# Patient Record
Sex: FEMALE | Race: WHITE | Employment: OTHER | URBAN - METROPOLITAN AREA
[De-identification: names, ages, dates, MRNs, and addresses within clinical notes are randomized per-mention and may not be internally consistent; named-entity substitution may affect disease eponyms.]

---

## 2017-01-04 ENCOUNTER — MYC MEDICAL ADVICE (OUTPATIENT)
Dept: FAMILY MEDICINE | Facility: CLINIC | Age: 67
End: 2017-01-04

## 2017-01-27 ENCOUNTER — ANESTHESIA EVENT (OUTPATIENT)
Dept: GASTROENTEROLOGY | Facility: CLINIC | Age: 67
End: 2017-01-27
Payer: COMMERCIAL

## 2017-01-27 NOTE — ANESTHESIA PREPROCEDURE EVALUATION
Anesthesia Evaluation     . Pt has not had prior anesthetic       ROS/MED HX    ENT/Pulmonary:  - neg pulmonary ROS     Neurologic:  - neg neurologic ROS     Cardiovascular:     (+) Dyslipidemia, ----. : . . . :. .       METS/Exercise Tolerance:     Hematologic:  - neg hematologic  ROS       Musculoskeletal:  - neg musculoskeletal ROS       GI/Hepatic:  - neg GI/hepatic ROS       Renal/Genitourinary:         Endo:  - neg endo ROS       Psychiatric:  - neg psychiatric ROS       Infectious Disease:  - neg infectious disease ROS       Malignancy:      - no malignancy   Other:    - neg other ROS           Physical Exam  Normal systems: cardiovascular, pulmonary and dental    Airway   Mallampati: II  TM distance: >3 FB  Neck ROM: full    Dental     Cardiovascular       Pulmonary                     Anesthesia Plan      History & Physical Review  History and physical reviewed and following examination; no interval change.    ASA Status:  2 .    NPO Status:  > 8 hours    Plan for MAC with Intravenous and Propofol induction. Maintenance will be Balanced.           Postoperative Care  Postoperative pain management:  Oral pain medications, IV analgesics and Multi-modal analgesia.      Consents  Anesthetic plan, risks, benefits and alternatives discussed with:  Patient..                          .

## 2017-01-30 ENCOUNTER — ANESTHESIA (OUTPATIENT)
Dept: GASTROENTEROLOGY | Facility: CLINIC | Age: 67
End: 2017-01-30
Payer: COMMERCIAL

## 2017-01-30 ENCOUNTER — HOSPITAL ENCOUNTER (OUTPATIENT)
Facility: CLINIC | Age: 67
Discharge: HOME | End: 2017-01-30
Attending: SURGERY | Admitting: SURGERY
Payer: COMMERCIAL

## 2017-01-30 ENCOUNTER — SURGERY (OUTPATIENT)
Age: 67
End: 2017-01-30

## 2017-01-30 VITALS
WEIGHT: 145 LBS | HEART RATE: 59 BPM | RESPIRATION RATE: 16 BRPM | OXYGEN SATURATION: 100 % | DIASTOLIC BLOOD PRESSURE: 56 MMHG | TEMPERATURE: 97.7 F | SYSTOLIC BLOOD PRESSURE: 101 MMHG | HEIGHT: 64 IN | BODY MASS INDEX: 24.75 KG/M2

## 2017-01-30 LAB — COLONOSCOPY: NORMAL

## 2017-01-30 PROCEDURE — 37000008 ZZH ANESTHESIA TECHNICAL FEE, 1ST 30 MIN: Performed by: SURGERY

## 2017-01-30 PROCEDURE — 88305 TISSUE EXAM BY PATHOLOGIST: CPT | Performed by: SURGERY

## 2017-01-30 PROCEDURE — 45385 COLONOSCOPY W/LESION REMOVAL: CPT | Mod: PT | Performed by: SURGERY

## 2017-01-30 PROCEDURE — 25000125 ZZHC RX 250: Performed by: NURSE ANESTHETIST, CERTIFIED REGISTERED

## 2017-01-30 PROCEDURE — 45385 COLONOSCOPY W/LESION REMOVAL: CPT | Performed by: SURGERY

## 2017-01-30 PROCEDURE — 25800025 ZZH RX 258: Performed by: SURGERY

## 2017-01-30 PROCEDURE — 88305 TISSUE EXAM BY PATHOLOGIST: CPT | Mod: 26 | Performed by: SURGERY

## 2017-01-30 PROCEDURE — 25000125 ZZHC RX 250: Performed by: SURGERY

## 2017-01-30 RX ORDER — LIDOCAINE 40 MG/G
CREAM TOPICAL
Status: DISCONTINUED | OUTPATIENT
Start: 2017-01-30 | End: 2017-01-30 | Stop reason: HOSPADM

## 2017-01-30 RX ORDER — LIDOCAINE HYDROCHLORIDE 10 MG/ML
INJECTION, SOLUTION INFILTRATION; PERINEURAL PRN
Status: DISCONTINUED | OUTPATIENT
Start: 2017-01-30 | End: 2017-01-30

## 2017-01-30 RX ORDER — SODIUM CHLORIDE, SODIUM LACTATE, POTASSIUM CHLORIDE, CALCIUM CHLORIDE 600; 310; 30; 20 MG/100ML; MG/100ML; MG/100ML; MG/100ML
INJECTION, SOLUTION INTRAVENOUS CONTINUOUS
Status: DISCONTINUED | OUTPATIENT
Start: 2017-01-30 | End: 2017-01-30 | Stop reason: HOSPADM

## 2017-01-30 RX ORDER — ONDANSETRON 2 MG/ML
4 INJECTION INTRAMUSCULAR; INTRAVENOUS
Status: DISCONTINUED | OUTPATIENT
Start: 2017-01-30 | End: 2017-01-30 | Stop reason: HOSPADM

## 2017-01-30 RX ORDER — PROPOFOL 10 MG/ML
INJECTION, EMULSION INTRAVENOUS CONTINUOUS PRN
Status: DISCONTINUED | OUTPATIENT
Start: 2017-01-30 | End: 2017-01-30

## 2017-01-30 RX ORDER — PROPOFOL 10 MG/ML
INJECTION, EMULSION INTRAVENOUS PRN
Status: DISCONTINUED | OUTPATIENT
Start: 2017-01-30 | End: 2017-01-30

## 2017-01-30 RX ADMIN — LIDOCAINE HYDROCHLORIDE 50 MG: 10 INJECTION, SOLUTION INFILTRATION; PERINEURAL at 11:04

## 2017-01-30 RX ADMIN — PROPOFOL 100 MG: 10 INJECTION, EMULSION INTRAVENOUS at 11:04

## 2017-01-30 RX ADMIN — LIDOCAINE HYDROCHLORIDE 1 ML: 10 INJECTION, SOLUTION INFILTRATION; PERINEURAL at 10:52

## 2017-01-30 RX ADMIN — PROPOFOL 150 MCG/KG/MIN: 10 INJECTION, EMULSION INTRAVENOUS at 11:04

## 2017-01-30 RX ADMIN — SODIUM CHLORIDE, POTASSIUM CHLORIDE, SODIUM LACTATE AND CALCIUM CHLORIDE: 600; 310; 30; 20 INJECTION, SOLUTION INTRAVENOUS at 10:52

## 2017-01-30 NOTE — ANESTHESIA CARE TRANSFER NOTE
Patient: Felicitas Avalos    COMBINED COLONOSCOPY, REMOVE TUMOR/POLYP/LESION BY SNARE (N/A Rectum)  Additional InformationProcedure(s):  Colonoscopy - Wound Class: II-Clean Contaminated    Diagnosis: screening  Diagnosis Additional Information: No value filed.    Anesthesia Type:   MAC     Note:  Airway :Nasal Cannula  Patient transferred to:Phase II        Vitals: (Last set prior to Anesthesia Care Transfer)              Electronically Signed By: ANGELITO Bundy CRNA  January 30, 2017  11:28 AM

## 2017-01-30 NOTE — ANESTHESIA POSTPROCEDURE EVALUATION
Patient: Felicitas Avalos    COMBINED COLONOSCOPY, REMOVE TUMOR/POLYP/LESION BY SNARE (N/A Rectum)  Additional InformationProcedure(s):  Colonoscopy - Wound Class: II-Clean Contaminated    Diagnosis:screening  Diagnosis Additional Information: No value filed.    Anesthesia Type:  MAC    Note:  Anesthesia Post Evaluation    Patient location during evaluation: Phase 2 and Bedside  Patient participation: Able to fully participate in evaluation  Level of consciousness: awake and alert  Pain management: adequate  Airway patency: patent  Cardiovascular status: acceptable and hemodynamically stable  Respiratory status: acceptable and room air  Hydration status: acceptable  PONV: none     Anesthetic complications: None          Last vitals:  Filed Vitals:    01/30/17 1037 01/30/17 1134   BP: 119/73 91/59   Pulse: 64 68   Temp: 36.5  C (97.7  F)    Resp: 16 16   SpO2: 96% 100%       Electronically Signed By: ANGELITO Bundy CRNA  January 30, 2017  11:40 AM

## 2017-01-30 NOTE — H&P
"66 year old year old female here for colonoscopy for screening.    Patient Active Problem List   Diagnosis     Pure hypercholesterolemia     Allergic rhinitis due to other allergen     Bunion     Sleep related leg cramps     HYPERLIPIDEMIA LDL GOAL <130     Advanced directives, counseling/discussion     ACP (advance care planning)       No past medical history on file.    Past Surgical History   Procedure Laterality Date     Colonoscopy  2003      Normal       [unfilled]    No current outpatient prescriptions on file.    No Known Allergies    Pt reports that she has never smoked. She has never used smokeless tobacco. She reports that she drinks alcohol. She reports that she does not use illicit drugs.    Exam:  /73 mmHg  Pulse 64  Temp(Src) 97.7  F (36.5  C) (Oral)  Resp 16  Ht 1.626 m (5' 4\")  Wt 65.772 kg (145 lb)  BMI 24.88 kg/m2  SpO2 96%    Awake, Alert OX3  Lungs - CTA bilaterally  CV - RRR, no murmurs, distal pulses intact  Abd - soft, non-distended, non-tender, +BS  Extr - No cyanosis or edema    A/P 66 year old year old female in need of colonoscopy for screening. Risks, benefits, alternatives, and complications were discussed including the possibility of perforation and the patient agreed to proceed    Josue Osborn MD   "

## 2017-02-01 LAB — COPATH REPORT: NORMAL

## 2017-02-02 PROBLEM — D12.6 TUBULAR ADENOMA OF COLON: Status: ACTIVE | Noted: 2017-02-02

## 2017-05-07 ENCOUNTER — DOCUMENTATION ONLY (OUTPATIENT)
Dept: OTHER | Facility: CLINIC | Age: 67
End: 2017-05-07

## 2017-05-07 DIAGNOSIS — Z71.89 ACP (ADVANCE CARE PLANNING): Chronic | ICD-10-CM

## 2017-05-07 DIAGNOSIS — Z71.89 ADVANCED DIRECTIVES, COUNSELING/DISCUSSION: ICD-10-CM

## 2017-09-14 ENCOUNTER — HOSPITAL ENCOUNTER (EMERGENCY)
Facility: CLINIC | Age: 67
Discharge: HOME | End: 2017-09-14
Attending: PHYSICIAN ASSISTANT | Admitting: PHYSICIAN ASSISTANT
Payer: MEDICARE

## 2017-09-14 VITALS
WEIGHT: 145 LBS | RESPIRATION RATE: 16 BRPM | OXYGEN SATURATION: 98 % | SYSTOLIC BLOOD PRESSURE: 118 MMHG | DIASTOLIC BLOOD PRESSURE: 70 MMHG | TEMPERATURE: 98.1 F | HEIGHT: 64 IN | HEART RATE: 66 BPM | BODY MASS INDEX: 24.75 KG/M2

## 2017-09-14 DIAGNOSIS — R39.15 URGENCY OF URINATION: ICD-10-CM

## 2017-09-14 DIAGNOSIS — R35.0 URINARY FREQUENCY: ICD-10-CM

## 2017-09-14 LAB
ALBUMIN UR-MCNC: 30 MG/DL
APPEARANCE UR: ABNORMAL
BACTERIA #/AREA URNS HPF: ABNORMAL /HPF
BILIRUB UR QL STRIP: NEGATIVE
COLOR UR AUTO: YELLOW
GLUCOSE UR STRIP-MCNC: NEGATIVE MG/DL
HGB UR QL STRIP: ABNORMAL
HYALINE CASTS #/AREA URNS LPF: 25 /LPF (ref 0–2)
KETONES UR STRIP-MCNC: NEGATIVE MG/DL
LEUKOCYTE ESTERASE UR QL STRIP: ABNORMAL
MUCOUS THREADS #/AREA URNS LPF: PRESENT /LPF
NITRATE UR QL: NEGATIVE
PH UR STRIP: 6 PH (ref 5–7)
RBC #/AREA URNS AUTO: 29 /HPF (ref 0–2)
SOURCE: ABNORMAL
SP GR UR STRIP: 1.02 (ref 1–1.03)
SQUAMOUS #/AREA URNS AUTO: 3 /HPF (ref 0–1)
UROBILINOGEN UR STRIP-MCNC: NORMAL MG/DL (ref 0–2)
WBC #/AREA URNS AUTO: 39 /HPF (ref 0–2)

## 2017-09-14 PROCEDURE — 87086 URINE CULTURE/COLONY COUNT: CPT | Performed by: PHYSICIAN ASSISTANT

## 2017-09-14 PROCEDURE — 99213 OFFICE O/P EST LOW 20 MIN: CPT | Performed by: PHYSICIAN ASSISTANT

## 2017-09-14 PROCEDURE — 99213 OFFICE O/P EST LOW 20 MIN: CPT

## 2017-09-14 PROCEDURE — 81001 URINALYSIS AUTO W/SCOPE: CPT | Performed by: PHYSICIAN ASSISTANT

## 2017-09-14 RX ORDER — NITROFURANTOIN 25; 75 MG/1; MG/1
100 CAPSULE ORAL 2 TIMES DAILY
Qty: 20 CAPSULE | Refills: 0 | Status: SHIPPED | OUTPATIENT
Start: 2017-09-14 | End: 2017-09-24

## 2017-09-14 NOTE — ED AVS SNAPSHOT
[facility]    [address] [address]    Phone:  [phone]    Fax:  [phone]                                       Felicitas Avalos   MRN: [id number]    Department:  [facility]   Date of Visit:  9/14/2017           After Visit Summary Signature Page     I have received my discharge instructions, and my questions have been answered. I have discussed any challenges I see with this plan with the nurse or doctor.    ..........................................................................................................................................  Patient/Patient Representative Signature      ..........................................................................................................................................  Patient Representative Print Name and Relationship to Patient    ..................................................               ................................................  Date                                            Time    ..........................................................................................................................................  Reviewed by Signature/Title    ...................................................              ..............................................  Date                                                            Time

## 2017-09-15 NOTE — ED PROVIDER NOTES
"SUBJECTIVE  Felicitas Avalos is a 67 year old female who has symptoms of urinary urgency and frequency for 3 hours(s).  she denies suprapubic pain and pressure, back pain, nausea, vomiting and fever.     OBJECTIVE     Vital signs noted and reviewed by Avi Noonan  /70  Pulse 66  Temp 98.1  F (36.7  C) (Oral)  Resp 16  Ht 1.626 m (5' 4\")  Wt 65.8 kg (145 lb)  SpO2 98%  BMI 24.89 kg/m2     PEFR:  General appearance: healthy, alert and no distress  Ears: R TM - normal: no effusions, no erythema, and normal landmarks, L TM - normal: no effusions, no erythema, and normal landmarks  Eyes: R normal, L normal  Nose: normal  Oropharynx: normal  Neck: supple and no adenopathy  Lungs: normal and clear to auscultation  Heart: S1, S2 normal, no murmur, click, rub or gallop, regular rate and rhythm, chest is clear without rales or wheezing, no pedal edema, no JVD, no hepatosplenomegaly  Abdomen: Abdomen soft, non-tender without masses or organomegaly           Labs:     Results for orders placed or performed during the hospital encounter of 09/14/17   UA reflex to Microscopic   Result Value Ref Range    Color Urine Yellow     Appearance Urine Slightly Cloudy     Glucose Urine Negative NEG^Negative mg/dL    Bilirubin Urine Negative NEG^Negative    Ketones Urine Negative NEG^Negative mg/dL    Specific Gravity Urine 1.024 1.003 - 1.035    Blood Urine Moderate (A) NEG^Negative    pH Urine 6.0 5.0 - 7.0 pH    Protein Albumin Urine 30 (A) NEG^Negative mg/dL    Urobilinogen mg/dL Normal 0.0 - 2.0 mg/dL    Nitrite Urine Negative NEG^Negative    Leukocyte Esterase Urine Large (A) NEG^Negative    Source Midstream Urine     RBC Urine 29 (H) 0 - 2 /HPF    WBC Urine 39 (H) 0 - 2 /HPF    Bacteria Urine Few (A) NEG^Negative /HPF    Squamous Epithelial /HPF Urine 3 (H) 0 - 1 /HPF    Mucous Urine Present (A) NEG^Negative /LPF    Hyaline Casts 25 (H) 0 - 2 /LPF          ASSESSMENT     (R35.0) Urinary frequency  Plan: nitroFURantoin, " macrocrystal-monohydrate,         (MACROBID) 100 MG capsule      (R39.15) Urgency of urination  Plan: nitroFURantoin, macrocrystal-monohydrate,         (MACROBID) 100 MG capsule       PLAN  Urinalysis discussed with patient  Treatment currentguidelines - also push fluids, may use Pyridium OTC prn. Follow up with PCP if these symptoms worsen or fail to improve as anticipated.    Rx for Macrobid for 10 days.    We will call with culture results if resistant.        Avi Noonan  9/14/2017, 7:24 PM       Avi Noonan PA-C  09/14/17 1959

## 2017-09-16 LAB
BACTERIA SPEC CULT: NORMAL
SPECIMEN SOURCE: NORMAL

## 2017-09-20 ENCOUNTER — RADIANT APPOINTMENT (OUTPATIENT)
Dept: GENERAL RADIOLOGY | Facility: CLINIC | Age: 67
End: 2017-09-20
Attending: NURSE PRACTITIONER
Payer: COMMERCIAL

## 2017-09-20 ENCOUNTER — OFFICE VISIT (OUTPATIENT)
Dept: FAMILY MEDICINE | Facility: CLINIC | Age: 67
End: 2017-09-20
Payer: COMMERCIAL

## 2017-09-20 VITALS
HEART RATE: 63 BPM | WEIGHT: 143 LBS | HEIGHT: 64 IN | BODY MASS INDEX: 24.41 KG/M2 | TEMPERATURE: 97.7 F | DIASTOLIC BLOOD PRESSURE: 75 MMHG | SYSTOLIC BLOOD PRESSURE: 112 MMHG

## 2017-09-20 DIAGNOSIS — M25.511 ACUTE PAIN OF RIGHT SHOULDER: ICD-10-CM

## 2017-09-20 DIAGNOSIS — M75.81 RIGHT ROTATOR CUFF TENDONITIS: Primary | ICD-10-CM

## 2017-09-20 DIAGNOSIS — Z23 NEED FOR PROPHYLACTIC VACCINATION AND INOCULATION AGAINST INFLUENZA: ICD-10-CM

## 2017-09-20 DIAGNOSIS — Z23 ENCOUNTER FOR IMMUNIZATION: ICD-10-CM

## 2017-09-20 PROCEDURE — 99213 OFFICE O/P EST LOW 20 MIN: CPT | Mod: 25 | Performed by: NURSE PRACTITIONER

## 2017-09-20 PROCEDURE — G0009 ADMIN PNEUMOCOCCAL VACCINE: HCPCS | Performed by: NURSE PRACTITIONER

## 2017-09-20 PROCEDURE — 90670 PCV13 VACCINE IM: CPT | Performed by: NURSE PRACTITIONER

## 2017-09-20 PROCEDURE — G0008 ADMIN INFLUENZA VIRUS VAC: HCPCS | Performed by: NURSE PRACTITIONER

## 2017-09-20 PROCEDURE — 90662 IIV NO PRSV INCREASED AG IM: CPT | Performed by: NURSE PRACTITIONER

## 2017-09-20 PROCEDURE — 73030 X-RAY EXAM OF SHOULDER: CPT | Mod: RT

## 2017-09-20 NOTE — MR AVS SNAPSHOT
After Visit Summary   9/20/2017    Felicitas Avalos    MRN: [id number]           Patient Information     Date Of Birth          1950        Visit Information        Provider Department      9/20/2017 8:40 AM Ana Bello APRN [facility]        Today's Diagnoses     Right rotator cuff tendonitis    -  1    Acute pain of right shoulder        Encounter for immunization        Need for prophylactic vaccination and inoculation against influenza          Care Instructions          Thank you for choosing [facility].  You may be receiving a survey in the mail from Deirdre Zendejas regarding your visit today.  Please take a few minutes to complete and return the survey to let us know how we are doing.      If you have questions or concerns, please contact us via Proximiant or you can contact your care team at [phone].    Our Clinic hours are:  Monday 6:40 am  to 7:00 pm  Tuesday -Friday 6:40 am to 5:00 pm    The [place] outpatient lab hours are:  Monday - Friday 6:10 am to 4:45 pm  Saturdays 7:00 am to 11:00 am  Appointments are required, call [phone]    If you have clinical questions after hours or would like to schedule an appointment,  call the clinic at [phone].            Follow-ups after your visit        Additional Services     PHYSICAL THERAPY REFERRAL       *This therapy referral will be filtered to a centralized scheduling office at [facility] and the patient will receive a call to schedule an appointment at a [place] location most convenient for them. *     [facility] provides Physical Therapy evaluation and treatment and many specialty services across the [place] system.  If requesting a specialty program, please choose from the list below.    If you have not heard from the scheduling office within 2 business days, please call [phone] for all locations, with the exception of [place], please call  "[phone].  Treatment: Evaluation & Treatment  Special Instructions/Modalities:   Special Programs: None    Please be aware that coverage of these services is subject to the terms and limitations of your health insurance plan.  Call member services at your health plan with any benefit or coverage questions.      **Note to Provider:  If you are referring outside of [place] for the therapy appointment, please list the name of the location in the \"special instructions\" above, print the referral and give to the patient to schedule the appointment.                  Who to contact     If you have questions or need follow up information about today's clinic visit or your schedule please contact [facility] directly at [phone].  Normal or non-critical lab and imaging results will be communicated to you by Chatteroushart, letter or phone within 4 business days after the clinic has received the results. If you do not hear from us within 7 days, please contact the clinic through Chatteroushart or phone. If you have a critical or abnormal lab result, we will notify you by phone as soon as possible.  Submit refill requests through Lifesum or call your pharmacy and they will forward the refill request to us. Please allow 3 business days for your refill to be completed.          Additional Information About Your Visit        MyChart Information     Lifesum gives you secure access to your electronic health record. If you see a primary care provider, you can also send messages to your care team and make appointments. If you have questions, please call your primary care clinic.  If you do not have a primary care provider, please call [phone] and they will assist you.        Care EveryWhere ID     This is your Care EveryWhere ID. This could be used by other organizations to access your [place] medical records  [id number]        Your Vitals Were     Pulse Temperature Height BMI (Body Mass Index)          63 97.7  F " "(36.5  C) (Oral) 5' 4\" (1.626 m) 24.55 kg/m2         Blood Pressure from Last 3 Encounters:   09/20/17 112/75   09/14/17 118/70   01/30/17 101/56    Weight from Last 3 Encounters:   09/20/17 143 lb (64.9 kg)   09/14/17 145 lb (65.8 kg)   01/30/17 145 lb (65.8 kg)              We Performed the Following     FLU VACCINE, INCREASED ANTIGEN, PRESV FREE, AGE 65+ [13094]     PHYSICAL THERAPY REFERRAL     Pneumococcal vaccine 13 valent PCV13 IM (Prevnar) [74885]     Vaccine Administration, Each Additional [88737]     Vaccine Administration, Initial [41824]        Primary Care Provider Office Phone # Fax #    Ana Danielson ANGELITO Mar [facility] [phone] [address] [address]        Equal Access to Services     GUSTAVO MATSON : Hadii lexy jayo Socoretta, waaxda luqadaha, qaybta kaalmada adeegyada, susie don . So [facility] [phone].    ATENCIÓN: Si habla español, tiene a bullock disposición servicios gratuitos de asistencia lingüística. Llame al [phone].    We comply with applicable federal civil rights laws and [place] laws. We do not discriminate on the basis of race, color, national origin, age, disability sex, sexual orientation or gender identity.            Thank you!     Thank you for choosing [facility]  for your care. Our goal is always to provide you with excellent care. Hearing back from our patients is one way we can continue to improve our services. Please take a few minutes to complete the written survey that you may receive in the mail after your visit with us. Thank you!             Your Updated Medication List - Protect others around you: Learn how to safely use, store and throw away your medicines at www.disposemymeds.org.          This list is accurate as of: 9/20/17  9:13 AM.  Always use your most recent med list.                   Brand Name Dispense Instructions for use Diagnosis    ADVIL PM PO      Take  by mouth. As " needed for sleep        ALAVERT PO      Take  by mouth as needed. For spring and fall allergies        aspirin 81 MG tablet     100    1 tab po QD (Once per day)    Pure hypercholesterolemia       BENADRYL ALLERGY PO      Take  by mouth as needed. For spring and fall allergies        multivitamin per tablet     100 Tab    take 1 Tab by mouth daily.        nitroFURantoin (macrocrystal-monohydrate) 100 MG capsule    MACROBID    20 capsule    Take 1 capsule (100 mg) by mouth 2 times daily for 10 days    Urinary frequency, Urgency of urination       * order for DME     2 Units    Dynaflex insert    Bilateral foot pain, Hallux rigidus of both feet       * order for DME     1 Device    Equipment being ordered: Dynaflex inserts    Hallux rigidus of right foot       simvastatin 40 MG tablet    ZOCOR    90 tablet    Take 1 tablet (40 mg) by mouth At Bedtime at bedtime.    Pure hypercholesterolemia       SUDAFED PO      Take  by mouth as needed. For spring and fall allergies.        * Notice:  This list has 2 medication(s) that are the same as other medications prescribed for you. Read the directions carefully, and ask your doctor or other care provider to review them with you.

## 2017-09-20 NOTE — NURSING NOTE
"Chief Complaint   Patient presents with     Musculoskeletal Problem     Flu Shot       Initial /75 (BP Location: Left arm)  Pulse 63  Temp 97.7  F (36.5  C) (Oral)  Ht 5' 4\" (1.626 m)  Wt 143 lb (64.9 kg)  BMI 24.55 kg/m2 Estimated body mass index is 24.55 kg/(m^2) as calculated from the following:    Height as of this encounter: 5' 4\" (1.626 m).    Weight as of this encounter: 143 lb (64.9 kg).  Medication Reconciliation: complete  "

## 2017-09-20 NOTE — PROGRESS NOTES
"  SUBJECTIVE:   Felicitas Avalos is a 67 year old female who presents to clinic today for the following health issues:      Joint Pain    Onset: right shoulder pain all summer when doing certain things like lifting her grandchild or golfing    Yesterday while golfing, she swung her club into the ground and felt a sharp pain in right anterior shoulder; and between shoulder and chest muscle        Description:   Location: right shoulder - anterior  Character: Sharp    Intensity: severe with any movement    Progression of Symptoms: worse    Accompanying Signs & Symptoms:  Other symptoms: radiation of pain to whole arm  No strength in hand for twisting -all summer  Hurts when she breaths  Has a pain on the right side of torso    History:   Previous similar pain: YES- has had shoulder ache all summer      Precipitating factors:   Trauma or overuse: YES    Alleviating factors:  Improved by: rest/inactivity and Ibuprofen    Therapies Tried and outcome:  Rest, ice and ibuprofen        Problem list and histories reviewed & adjusted, as indicated.  Additional history: as documented    Reviewed and updated as needed this visit by clinical staff  Tobacco  Allergies  Meds  Med Hx  Surg Hx  Fam Hx  Soc Hx      Reviewed and updated as needed this visit by Provider         ROS:  Constitutional, HEENT, cardiovascular, pulmonary, gi and gu systems are negative, except as otherwise noted.      OBJECTIVE:   /75 (BP Location: Left arm)  Pulse 63  Temp 97.7  F (36.5  C) (Oral)  Ht 5' 4\" (1.626 m)  Wt 143 lb (64.9 kg)  BMI 24.55 kg/m2  Body mass index is 24.55 kg/(m^2).  GENERAL: healthy, alert and no distress  MS: Inspection: no swelling, bruising, discoloration, or obvious deformity or asymmetry  Tender: anterior capsule  Non-tender: mid-distal clavicle, AC joint, proximal bicep tendon, greater tuberosity, supraspinatus  and infraspinatus  Range of Motion  Active:all normal  Strength: rotator cuff strength full  Special " tests:  Positive: Jessica  Negative: Kranthi      Xray independently reviewed, negative. Radiologist read pending.      ASSESSMENT/PLAN:       ICD-10-CM    1. Right rotator cuff tendonitis M75.81 Recommend PHYSICAL THERAPY REFERRAL  Follow up in one month if no improvement with PT.     2. Acute pain of right shoulder M25.511 XR Shoulder Right 2 Views   3. Encounter for immunization Z23 Pneumococcal vaccine 13 valent PCV13 IM (Prevnar) [96916]     Vaccine Administration, Each Additional [99002]   4. Need for prophylactic vaccination and inoculation against influenza Z23 FLU VACCINE, INCREASED ANTIGEN, PRESV FREE, AGE 65+ [85154]     Vaccine Administration, Initial [91659]         The risks, benefits and treatment options of prescribed medications or other treatments have been discussed with the patient. The patient verbalized their understanding and should call or follow up if no improvement or if they develop further problems.    ANGELITO Madden [facility]      Injectable Influenza Immunization Documentation    1.  Is the person to be vaccinated sick today?   No    2. Does the person to be vaccinated have an allergy to a component   of the vaccine?   No    3. Has the person to be vaccinated ever had a serious reaction   to influenza vaccine in the past?   No    4. Has the person to be vaccinated ever had Guillain-Barré syndrome?   No    Form completed by KIRSTEN CRONIN

## 2017-09-20 NOTE — PATIENT INSTRUCTIONS
Thank you for choosing [facility].  You may be receiving a survey in the mail from Deirdre Zendejas regarding your visit today.  Please take a few minutes to complete and return the survey to let us know how we are doing.      If you have questions or concerns, please contact us via Carnet de Mode or you can contact your care team at [phone].    Our Clinic hours are:  Monday 6:40 am  to 7:00 pm  Tuesday -Friday 6:40 am to 5:00 pm    The [place] outpatient lab hours are:  Monday - Friday 6:10 am to 4:45 pm  Saturdays 7:00 am to 11:00 am  Appointments are required, call [phone]    If you have clinical questions after hours or would like to schedule an appointment,  call the clinic at [phone].

## 2017-09-22 ENCOUNTER — HOSPITAL ENCOUNTER (OUTPATIENT)
Dept: PHYSICAL THERAPY | Facility: CLINIC | Age: 67
Setting detail: THERAPIES SERIES
End: 2017-09-22
Attending: NURSE PRACTITIONER
Payer: MEDICARE

## 2017-09-22 PROCEDURE — 40000718 ZZHC STATISTIC PT DEPARTMENT ORTHO VISIT: Performed by: PHYSICAL THERAPIST

## 2017-09-22 PROCEDURE — G8986 CARRY D/C STATUS: HCPCS | Mod: GP,CI | Performed by: PHYSICAL THERAPIST

## 2017-09-22 PROCEDURE — 97110 THERAPEUTIC EXERCISES: CPT | Mod: GP | Performed by: PHYSICAL THERAPIST

## 2017-09-22 PROCEDURE — 97161 PT EVAL LOW COMPLEX 20 MIN: CPT | Mod: GP | Performed by: PHYSICAL THERAPIST

## 2017-09-22 PROCEDURE — G8984 CARRY CURRENT STATUS: HCPCS | Mod: GP,CI | Performed by: PHYSICAL THERAPIST

## 2017-09-22 PROCEDURE — G8985 CARRY GOAL STATUS: HCPCS | Mod: GP,CI | Performed by: PHYSICAL THERAPIST

## 2017-09-22 NOTE — PROGRESS NOTES
[facility]          OUTPATIENT PHYSICAL THERAPY ORTHOPEDIC EVALUATION  PLAN OF TREATMENT FOR OUTPATIENT REHABILITATION  (COMPLETE FOR INITIAL CLAIMS ONLY)  Patient's Last Name, First Name, M.I.  YOB: 1950  Felicitas Avalos    Provider s Name:  [facility]   Medical Record No.  [id number]   Start of Care Date:  09/22/17   Onset Date:  09/19/17   Type:     _X__PT   ___OT   ___SLP Medical Diagnosis:  R shouler tendonitis     PT Diagnosis:  R shoulder Impingement, min pain, min weakness   Visits from SOC:  1      _________________________________________________________________________________  Plan of Treatment/Functional Goals:  strengthening, stretching           Goals     Goal Description: indep in HEP for return to all prior activities in 1 visit  Target Date: 09/22/17       Goal Description: pt will be able to perform all prior activities without ahld pain in 4wk  Target Date: 10/22/17     Therapy Frequency:  1 time/week  Predicted Duration of Therapy Intervention:  1x planned, pt to self monitor over next 3-4 wks, will only return if  not 100% better    Kris Hoenk, PT                 I CERTIFY THE NEED FOR THESE SERVICES FURNISHED UNDER        THIS PLAN OF TREATMENT AND WHILE UNDER MY CARE     (Physician co-signature of this document indicates review and certification of the therapy plan).                         Certification Date From:  09/22/17   Certification Date To:  10/22/17    Referring Provider:  shoulder pain    Initial Assessment        See Epic Evaluation Start of Care Date: 09/22/17

## 2017-09-22 NOTE — PROGRESS NOTES
Felicitas Avalos   PHYSICAL THERAPY EVALUATION    09/22/17 1200   General Information   Type of Visit Initial OP Ortho PT Evaluation   Start of Care Date 09/22/17   Referring Physician Ana Bello NP   Patient/Family Goals Statement decrease pain, be able to do everything   Orders Evaluate and Treat   Date of Order 09/20/17   Insurance Type [employer];Medicare   Medical Diagnosis R shoulder tendonitis   Body Part(s)   Body Part(s) Shoulder   Presentation and Etiology   Pertinent history of current problem (include personal factors and/or comorbidities that impact the POC) R pranav bothered her since spring 2017, maybe lifitng grand DTR, golf.  PLays golf 3x/wk.  Then took a divet playing golf  9/19/17and hurt the shoulder, pain was severe, hurt to breathe.  Iced, resting, has calmed down some.  Pain not too bad.  Was 8/10.  Sx increase reach overhead, lift, carry golf.  R hand dominant   Onset date of current episode/exacerbation 09/19/17   Prior Level of Function   Functional Level Prior Comment golf, house/yardwork, baby sit grand DTR 30# 1 yo   Current Level of Function   Patient role/employment history F. Retired   Fall Risk Screen   Fall screen completed by PT   Per patient - Fall 2 or more times in past year? No   Per patient - Fall with injury in past year? No   Is patient a fall risk? No   Shoulder Objective Findings   Side (if bilateral, select both right and left) Right   Posture R shoulder slight ant tipped position, increased mm tone R upper trap   Cervical Screen (ROM, quadrant) WNL   Scapulothoracic Rhythm WNL   Coracoid Test min +R   Palpation no tenderness   Right Shoulder Flexion AROM WNl   Right Shoulder Abduction AROM WNL   Right Shoulder ER PROM 90* at 90 abd   Right Shoulder IR AROM WNL   Right Shoulder IR PROM 60* at 90abd   Right Shoulder Flexion Strength 4 due to mild pain   Right Shoulder ER Strength 5- no pain   Right Shoulder IR Strength 5   Right Shoulder Extension Strength 5    Right Mid Trapezius Strength 4   Planned Therapy Interventions   Planned Therapy Interventions strengthening;stretching   Clinical Impression   Criteria for Skilled Therapeutic Interventions Met yes, treatment indicated   PT Diagnosis R shoulder Impingement, min pain, min weakness   Functional limitations due to impairments golf, reaching, lifting   Clinical Presentation Stable/Uncomplicated   Clinical Decision Making (Complexity) Low complexity   Therapy Frequency 1 time/week   Predicted Duration of Therapy Intervention (days/wks) 1x planned, pt to self monitor over next 3-4 wks, will only return if  not 100% better   Risk & Benefits of therapy have been explained Yes   Patient, Family & other staff in agreement with plan of care Yes   Education Assessment   Preferred Learning Style Listening;Pictures/video   Barriers to Learning No barriers   ORTHO GOALS   PT Ortho Eval Goals 1   Ortho Goal 1   Goal Description indep in HEP for return to all prior activities in 1 visit   Target Date 09/22/17   Date Met 09/22/17   Ortho Goal 2   Goal Description pt will be able to perform all prior activities without ahld pain in 4wk   Target Date 10/22/17   Total Evaluation Time   Total Evaluation Time 15   Therapy Certification   Certification date from 09/22/17   Certification date to 10/22/17   Medical Diagnosis R shouler tendonitis   Kris Hoenk, PT #[id number]  [facility]

## 2017-10-26 ENCOUNTER — OFFICE VISIT (OUTPATIENT)
Dept: FAMILY MEDICINE | Facility: CLINIC | Age: 67
End: 2017-10-26
Payer: COMMERCIAL

## 2017-10-26 VITALS
WEIGHT: 148 LBS | HEART RATE: 59 BPM | HEIGHT: 64 IN | TEMPERATURE: 97.3 F | BODY MASS INDEX: 25.27 KG/M2 | DIASTOLIC BLOOD PRESSURE: 67 MMHG | SYSTOLIC BLOOD PRESSURE: 108 MMHG

## 2017-10-26 DIAGNOSIS — Z12.31 ENCOUNTER FOR SCREENING MAMMOGRAM FOR BREAST CANCER: ICD-10-CM

## 2017-10-26 DIAGNOSIS — R35.0 URINARY FREQUENCY: Primary | ICD-10-CM

## 2017-10-26 DIAGNOSIS — R39.9 SYMPTOMS INVOLVING URINARY SYSTEM: ICD-10-CM

## 2017-10-26 LAB
ALBUMIN UR-MCNC: NEGATIVE MG/DL
APPEARANCE UR: CLEAR
BILIRUB UR QL STRIP: NEGATIVE
COLOR UR AUTO: YELLOW
GLUCOSE UR STRIP-MCNC: NEGATIVE MG/DL
HGB UR QL STRIP: ABNORMAL
KETONES UR STRIP-MCNC: NEGATIVE MG/DL
LEUKOCYTE ESTERASE UR QL STRIP: NEGATIVE
NITRATE UR QL: NEGATIVE
PH UR STRIP: 5.5 PH (ref 5–7)
RBC #/AREA URNS AUTO: NORMAL /HPF
SOURCE: ABNORMAL
SP GR UR STRIP: <=1.005 (ref 1–1.03)
UROBILINOGEN UR STRIP-ACNC: 0.2 EU/DL (ref 0.2–1)
WBC #/AREA URNS AUTO: NORMAL /HPF

## 2017-10-26 PROCEDURE — 81001 URINALYSIS AUTO W/SCOPE: CPT | Performed by: NURSE PRACTITIONER

## 2017-10-26 PROCEDURE — 99213 OFFICE O/P EST LOW 20 MIN: CPT | Performed by: NURSE PRACTITIONER

## 2017-10-26 NOTE — PROGRESS NOTES
"  SUBJECTIVE:   Felicitas Avalos is a 67 year old female who presents to clinic today for the following health issues:      URINARY TRACT SYMPTOMS  Onset: ER visit on 9/14 and has continued to have some symptoms    Description:   Painful urination (Dysuria): no   Blood in urine (Hematuria): no   Delay in urine (Hesitency): YES- a little bit  Not completely emptying either  Urine odor  Urine frequency    Intensity: mild    Progression of Symptoms:  Worsened yesterday    Accompanying Signs & Symptoms:  Fever/chills: no   Flank pain YES- a little soreness, comes and goes  Nausea and vomiting: no   Any vaginal symptoms: none  Abdominal/Pelvic Pain: no     History:     Urinary symptoms in September- given antibiotics for 10 days    Culture was negative    But was having symptoms  + microscopic results.  History of frequent UTI's: YES  History of kidney stones: no   Sexually Active: YES  Possibility of pregnancy: No    Precipitating factors:   unknown    Therapies Tried and outcome: Cranberry juice prn (contraindicated in Coumadin patients) and Increase fluid intake      Symptoms are not continuous.  Episodes occur once per week and last 2-4 hours.  During the episodes she will have urinary frequency - needed to urinate every 15 minutes.  Then the episodes resolve spontaneously.  She is symptom-free in between episodes.            Problem list and histories reviewed & adjusted, as indicated.  Additional history: as documented    Reviewed and updated as needed this visit by clinical staff  Tobacco  Allergies  Meds  Med Hx  Surg Hx  Fam Hx  Soc Hx      Reviewed and updated as needed this visit by Provider         ROS:  Constitutional, HEENT, cardiovascular, pulmonary, gi and gu systems are negative, except as otherwise noted.      OBJECTIVE:   /67 (BP Location: Left arm)  Pulse 59  Temp 97.3  F (36.3  C) (Oral)  Ht 5' 4\" (1.626 m)  Wt 148 lb (67.1 kg)  BMI 25.4 kg/m2  Body mass index is 25.4 " kg/(m^2).  GENERAL: healthy, alert and no distress  RESP: lungs clear to auscultation - no rales, rhonchi or wheezes  CV: regular rate and rhythm, normal S1 S2, no S3 or S4, no murmur, click or rub, no peripheral edema and peripheral pulses strong  ABDOMEN: soft, nontender, no hepatosplenomegaly, no masses and bowel sounds normal    Diagnostic Test Results:  Results for orders placed or performed in visit on 10/26/17 (from the past 24 hour(s))   *UA reflex to Microscopic and Culture ([place] [facility] (except [place] and [place])   Result Value Ref Range    Color Urine Yellow     Appearance Urine Clear     Glucose Urine Negative NEG^Negative mg/dL    Bilirubin Urine Negative NEG^Negative    Ketones Urine Negative NEG^Negative mg/dL    Specific Gravity Urine <=1.005 1.003 - 1.035    Blood Urine Small (A) NEG^Negative    pH Urine 5.5 5.0 - 7.0 pH    Protein Albumin Urine Negative NEG^Negative mg/dL    Urobilinogen Urine 0.2 0.2 - 1.0 EU/dL    Nitrite Urine Negative NEG^Negative    Leukocyte Esterase Urine Negative NEG^Negative    Source Midstream Urine    Urine Microscopic   Result Value Ref Range    WBC Urine O - 2 OTO2^O - 2 /HPF    RBC Urine O - 2 OTO2^O - 2 /HPF       ASSESSMENT/PLAN:       ICD-10-CM    1. Urinary frequency R35.0 Patient is having once weekly episodes of urinary frequency every 15 minutes that last 2-4 hours and then spontaneously resolve.  No evidence for infection on UA today.  Will refer to urology for evaluation - possible bladder spasms?    UROLOGY ADULT REFERRAL   2. Symptoms involving urinary system R39.9 *UA reflex to Microscopic and Culture ([place] [facility] (except [place] and [place])     Urine Microscopic   3. Encounter for screening mammogram for breast cancer Z12.31 MA Screening Digital Bilateral       Patient Instructions   Schedule urology appointment.      Call [phone] to schedule mammogram.        The risks, benefits and treatment options of  prescribed medications or other treatments have been discussed with the patient. The patient verbalized their understanding and should call or follow up if no improvement or if they develop further problems.    ANGELITO Madden [facility]

## 2017-10-26 NOTE — PATIENT INSTRUCTIONS
Schedule urology appointment.      Call [phone] to schedule mammogram.          Thank you for choosing [facility].  You may be receiving a survey in the mail from Deirdre Zendejas regarding your visit today.  Please take a few minutes to complete and return the survey to let us know how we are doing.      If you have questions or concerns, please contact us via Thingies or you can contact your care team at [phone].    Our Clinic hours are:  Monday 6:40 am  to 7:00 pm  Tuesday -Friday 6:40 am to 5:00 pm    The [place] outpatient lab hours are:  Monday - Friday 6:10 am to 4:45 pm  Saturdays 7:00 am to 11:00 am  Appointments are required, call [phone]    If you have clinical questions after hours or would like to schedule an appointment,  call the clinic at [phone].

## 2017-10-26 NOTE — MR AVS SNAPSHOT
After Visit Summary   10/26/2017    Felicitas Avalos    MRN: [id number]           Patient Information     Date Of Birth          1950        Visit Information        Provider Department      10/26/2017 8:40 AM Ana Bello APRN [facility]        Today's Diagnoses     Symptoms involving urinary system    -  1    Encounter for screening mammogram for breast cancer        Urinary frequency          Care Instructions    Schedule urology appointment.      Call [phone] to schedule mammogram.          Thank you for choosing [facility].  You may be receiving a survey in the mail from Deirdre Zendejas regarding your visit today.  Please take a few minutes to complete and return the survey to let us know how we are doing.      If you have questions or concerns, please contact us via InHiro or you can contact your care team at [phone].    Our Clinic hours are:  Monday 6:40 am  to 7:00 pm  Tuesday -Friday 6:40 am to 5:00 pm    The [place] outpatient lab hours are:  Monday - Friday 6:10 am to 4:45 pm  Saturdays 7:00 am to 11:00 am  Appointments are required, call [phone]    If you have clinical questions after hours or would like to schedule an appointment,  call the clinic at [phone].            Follow-ups after your visit        Additional Services     UROLOGY ADULT REFERRAL       Your provider has referred you to: [facility]: [facility] [place] [phone]   [email or web address]    Please be aware that coverage of these services is subject to the terms and limitations of your health insurance plan.  Call member services at your health plan with any benefit or coverage questions.      Please bring the following with you to your appointment:    (1) Any X-Rays, CTs or MRIs which have been performed.  Contact the facility where they were done to arrange for  prior to your scheduled appointment.    (2) List of  "current medications  (3) This referral request   (4) Any documents/labs given to you for this referral                  Future tests that were ordered for you today     Open Future Orders        Priority Expected Expires Ordered    MA Screening Digital Bilateral Routine  10/26/2018 10/26/2017            Who to contact     If you have questions or need follow up information about today's clinic visit or your schedule please contact [facility] directly at [phone].  Normal or non-critical lab and imaging results will be communicated to you by [employer], letter or phone within 4 business days after the clinic has received the results. If you do not hear from us within 7 days, please contact the clinic through Highcon or phone. If you have a critical or abnormal lab result, we will notify you by phone as soon as possible.  Submit refill requests through Highcon or call your pharmacy and they will forward the refill request to us. Please allow 3 business days for your refill to be completed.          Additional Information About Your Visit        Highcon Information     High[employer] gives you secure access to your electronic health record. If you see a primary care provider, you can also send messages to your care team and make appointments. If you have questions, please call your primary care clinic.  If you do not have a primary care provider, please call [phone] and they will assist you.        Care EveryWhere ID     This is your Care EveryWhere ID. This could be used by other organizations to access your [place] medical records  [id number]        Your Vitals Were     Pulse Temperature Height BMI (Body Mass Index)          59 97.3  F (36.3  C) (Oral) 5' 4\" (1.626 m) 25.4 kg/m2         Blood Pressure from Last 3 Encounters:   10/26/17 108/67   09/20/17 112/75   09/14/17 118/70    Weight from Last 3 Encounters:   10/26/17 148 lb (67.1 kg)   09/20/17 143 lb (64.9 kg)   09/14/17 145 lb (65.8 kg)         "      We Performed the Following     *UA reflex to Microscopic and Culture ([place] [facility] (except [place] and [place])     Urine Microscopic     UROLOGY ADULT REFERRAL        Primary Care Provider Office Phone # Fax #    Ana Bello, ANGELITO MARIE [phone] [address] [facility] [address]        Equal Access to Services     GUSTAVO MATSON : Hadii aad ku hadasho Soomaali, waaxda luqadaha, qaybta kaalmada adeegyada, waxay idiin hayaan adeeg kharash la'aan . So [facility] [phone].    ATENCIÓN: Si habla español, tiene a bullock disposición servicios gratuitos de asistencia lingüística. Llame al [phone].    We comply with applicable federal civil rights laws and [place] laws. We do not discriminate on the basis of race, color, national origin, age, disability, sex, sexual orientation, or gender identity.            Thank you!     Thank you for choosing [facility]  for your care. Our goal is always to provide you with excellent care. Hearing back from our patients is one way we can continue to improve our services. Please take a few minutes to complete the written survey that you may receive in the mail after your visit with us. Thank you!             Your Updated Medication List - Protect others around you: Learn how to safely use, store and throw away your medicines at www.disposemymeds.org.          This list is accurate as of: 10/26/17  9:12 AM.  Always use your most recent med list.                   Brand Name Dispense Instructions for use Diagnosis    ADVIL PM PO      Take  by mouth. As needed for sleep        ALAVERT PO      Take  by mouth as needed. For spring and fall allergies        aspirin 81 MG tablet     100    1 tab po QD (Once per day)    Pure hypercholesterolemia       BENADRYL ALLERGY PO      Take  by mouth as needed. For spring and fall allergies        multivitamin per tablet     100 Tab    take 1 Tab by mouth daily.        * order  for DME     2 Units    Dynaflex insert    Bilateral foot pain, Hallux rigidus of both feet       * order for DME     1 Device    Equipment being ordered: Dynaflex inserts    Hallux rigidus of right foot       simvastatin 40 MG tablet    ZOCOR    90 tablet    Take 1 tablet (40 mg) by mouth At Bedtime at bedtime.    Pure hypercholesterolemia       SUDAFED PO      Take  by mouth as needed. For spring and fall allergies.        * Notice:  This list has 2 medication(s) that are the same as other medications prescribed for you. Read the directions carefully, and ask your doctor or other care provider to review them with you.

## 2017-10-26 NOTE — NURSING NOTE
"Chief Complaint   Patient presents with     Urinary Problem     Health Maintenance     last mammogram 2/2/2016       Initial /67 (BP Location: Left arm)  Pulse 59  Temp 97.3  F (36.3  C) (Oral)  Ht 5' 4\" (1.626 m)  Wt 148 lb (67.1 kg)  BMI 25.4 kg/m2 Estimated body mass index is 25.4 kg/(m^2) as calculated from the following:    Height as of this encounter: 5' 4\" (1.626 m).    Weight as of this encounter: 148 lb (67.1 kg).  Medication Reconciliation: complete  "

## 2017-10-27 NOTE — ADDENDUM NOTE
Encounter addended by: Hoenk, Kris, PT on: 10/27/2017  2:48 PM<BR>     Actions taken: Sign clinical note, Episode resolved

## 2017-10-27 NOTE — PROGRESS NOTES
PHYSICAL THERAPY DISCHARGE        Patient was seen one time as planned.  No further contacts have been made.  Will discharge this episode of care.    Kris Hoenk, PT #[id number]  [facility]

## 2017-11-17 ENCOUNTER — HOSPITAL ENCOUNTER (OUTPATIENT)
Dept: MAMMOGRAPHY | Facility: CLINIC | Age: 67
Discharge: HOME | End: 2017-11-17
Attending: NURSE PRACTITIONER | Admitting: NURSE PRACTITIONER
Payer: MEDICARE

## 2017-11-17 DIAGNOSIS — Z12.31 ENCOUNTER FOR SCREENING MAMMOGRAM FOR BREAST CANCER: ICD-10-CM

## 2017-11-17 PROCEDURE — 77063 BREAST TOMOSYNTHESIS BI: CPT

## 2017-11-17 PROCEDURE — G0202 SCR MAMMO BI INCL CAD: HCPCS

## 2017-12-05 ENCOUNTER — OFFICE VISIT (OUTPATIENT)
Dept: UROLOGY | Facility: CLINIC | Age: 67
End: 2017-12-05
Payer: COMMERCIAL

## 2017-12-05 VITALS
BODY MASS INDEX: 25.27 KG/M2 | RESPIRATION RATE: 12 BRPM | WEIGHT: 148 LBS | HEIGHT: 64 IN | SYSTOLIC BLOOD PRESSURE: 118 MMHG | DIASTOLIC BLOOD PRESSURE: 79 MMHG | HEART RATE: 70 BPM

## 2017-12-05 DIAGNOSIS — R35.0 URINARY FREQUENCY: Primary | ICD-10-CM

## 2017-12-05 LAB
ALBUMIN UR-MCNC: NEGATIVE MG/DL
APPEARANCE UR: CLEAR
BILIRUB UR QL STRIP: NEGATIVE
COLOR UR AUTO: YELLOW
GLUCOSE UR STRIP-MCNC: NEGATIVE MG/DL
HGB UR QL STRIP: ABNORMAL
KETONES UR STRIP-MCNC: NEGATIVE MG/DL
LEUKOCYTE ESTERASE UR QL STRIP: NEGATIVE
NITRATE UR QL: NEGATIVE
PH UR STRIP: 6.5 PH (ref 5–7)
RBC #/AREA URNS AUTO: ABNORMAL /HPF
SOURCE: ABNORMAL
SP GR UR STRIP: <=1.005 (ref 1–1.03)
UROBILINOGEN UR STRIP-ACNC: 0.2 EU/DL (ref 0.2–1)
WBC #/AREA URNS AUTO: ABNORMAL /HPF

## 2017-12-05 PROCEDURE — 52000 CYSTOURETHROSCOPY: CPT | Performed by: UROLOGY

## 2017-12-05 PROCEDURE — 99205 OFFICE O/P NEW HI 60 MIN: CPT | Mod: 25 | Performed by: UROLOGY

## 2017-12-05 PROCEDURE — 81001 URINALYSIS AUTO W/SCOPE: CPT | Performed by: UROLOGY

## 2017-12-05 NOTE — NURSING NOTE
"Chief Complaint   Patient presents with     Urinary Frequency     Ref Dr Bello       Initial /79 (BP Location: Right arm, Patient Position: Chair, Cuff Size: Adult Regular)  Pulse 70  Resp 12  Ht 1.626 m (5' 4\")  Wt 67.1 kg (148 lb)  BMI 25.4 kg/m2 Estimated body mass index is 25.4 kg/(m^2) as calculated from the following:    Height as of this encounter: 1.626 m (5' 4\").    Weight as of this encounter: 67.1 kg (148 lb).  Medication Reconciliation: complete     Gillian Ahn MA      "

## 2017-12-05 NOTE — MR AVS SNAPSHOT
"              After Visit Summary   12/5/2017    Felicitas vAalos    MRN: [id number]           Patient Information     Date Of Birth          1950        Visit Information        Provider Department      12/5/2017 1:15 PM Molina Turner MD [facility]        Today's Diagnoses     Urinary frequency    -  1       Follow-ups after your visit        Follow-up notes from your care team     Return if symptoms worsen or fail to improve.      Who to contact     If you have questions or need follow up information about today's clinic visit or your schedule please contact [facility] directly at [phone].  Normal or non-critical lab and imaging results will be communicated to you by MyChart, letter or phone within 4 business days after the clinic has received the results. If you do not hear from us within 7 days, please contact the clinic through [employer]es[employer] or phone. If you have a critical or abnormal lab result, we will notify you by phone as soon as possible.  Submit refill requests through [employer] or call your pharmacy and they will forward the refill request to us. Please allow 3 business days for your refill to be completed.          Additional Information About Your Visit        MyChart Information     [employer] gives you secure access to your electronic health record. If you see a primary care provider, you can also send messages to your care team and make appointments. If you have questions, please call your primary care clinic.  If you do not have a primary care provider, please call [phone] and they will assist you.        Care EveryWhere ID     This is your Care EveryWhere ID. This could be used by other organizations to access your [place] medical records  [id number]        Your Vitals Were     Pulse Respirations Height BMI (Body Mass Index)          70 12 1.626 m (5' 4\") 25.4 kg/m2         Blood Pressure from Last 3 Encounters:   12/05/17 118/79   10/26/17 108/67 "   09/20/17 112/75    Weight from Last 3 Encounters:   12/05/17 67.1 kg (148 lb)   10/26/17 67.1 kg (148 lb)   09/20/17 64.9 kg (143 lb)              We Performed the Following     CYSTOURETHROSCOPY ([address])     UA reflex to Microscopic and Culture [[id number]]     Urine Microscopic        Primary Care Provider Office Phone # Fax #    Ana Bello, APRN [facility] [phone]       [address] [facility] [address]        Equal Access to Services     GUSTAVO MATSON : Hadii aad ku hadasho Soomaali, waaxda luqadaha, qaybta kaalmada adeegyada, waxflorecita don . So [facility] [phone].    ATENCIÓN: Si habla español, tiene a bullock disposición servicios gratuitos de asistencia lingüística. Llame al [phone].    We comply with applicable federal civil rights laws and [place] laws. We do not discriminate on the basis of race, color, national origin, age, disability, sex, sexual orientation, or gender identity.            Thank you!     Thank you for choosing [facility]  for your care. Our goal is always to provide you with excellent care. Hearing back from our patients is one way we can continue to improve our services. Please take a few minutes to complete the written survey that you may receive in the mail after your visit with us. Thank you!             Your Updated Medication List - Protect others around you: Learn how to safely use, store and throw away your medicines at www.disposemymeds.org.          This list is accurate as of: 12/5/17  2:18 PM.  Always use your most recent med list.                   Brand Name Dispense Instructions for use Diagnosis    ADVIL PM PO      Take  by mouth. As needed for sleep        ALAVERT PO      Take  by mouth as needed. For spring and fall allergies        aspirin 81 MG tablet     100    1 tab po QD (Once per day)    Pure hypercholesterolemia       BENADRYL ALLERGY PO      Take  by mouth as needed. For spring and fall  allergies        multivitamin per tablet     100 Tab    take 1 Tab by mouth daily.        * order for DME     2 Units    Dynaflex insert    Bilateral foot pain, Hallux rigidus of both feet       * order for DME     1 Device    Equipment being ordered: Dynaflex inserts    Hallux rigidus of right foot       simvastatin 40 MG tablet    ZOCOR    90 tablet    Take 1 tablet (40 mg) by mouth At Bedtime at bedtime.    Pure hypercholesterolemia       SUDAFED PO      Take  by mouth as needed. For spring and fall allergies.        * Notice:  This list has 2 medication(s) that are the same as other medications prescribed for you. Read the directions carefully, and ask your doctor or other care provider to review them with you.

## 2017-12-05 NOTE — NURSING NOTE
Pt brought back to the procedure room for a cystoscopy per Dr. Turner  Informed consent obtained, pt prepped in a sterile manner.    Scope Number: Atul Storz 70 Rigid: [id number]    Gillian Ahn MA

## 2017-12-05 NOTE — PROGRESS NOTES
"Felicitas Avalos is a 67 year old female seen in consultation for episodic bladder spasms. Consult from Ana Bello.      Pt reports several mos hx episodic bladder \"spasms,\" does not have at the present time. Was seen for this in Sept, dx with UTI but UC ultimately negative. Also seen for this in late Oct, urine also negative (<1.005).       Denies dysuria, gross hematuria, frequency, incont; nocturia x 0-1.    No prior  evals, hx bladder surgery, use of bladder meds.     Hx 2 vag deliveries. Not on HRT. Denies constipation.     Drinks 4 caf coffee per day, 8 Habematolel \"because it's good for you\"    (Did not complete voiding diary)      Past Surgical History:   Procedure Laterality Date     COLONOSCOPY  2003     Normal       Social History     Social History     Marital status:      Spouse name: N/A     Number of children: N/A     Years of education: N/A     Occupational History     Not on file.     Social History Main Topics     Smoking status: Never Smoker     Smokeless tobacco: Never Used     Alcohol use Yes      Comment: 1-2 glass of wine per month;     Drug use: No     Sexual activity: Yes     Partners: Male     Birth control/ protection: Post-menopausal, None      Comment:      Other Topics Concern     Parent/Sibling W/ Cabg, Mi Or Angioplasty Before 65f 55m? Yes     Social History Narrative       Current Outpatient Prescriptions   Medication Sig Dispense Refill     simvastatin (ZOCOR) 40 MG tablet Take 1 tablet (40 mg) by mouth At Bedtime at bedtime. 90 tablet 3     order for DME Equipment being ordered: Dynaflex inserts 1 Device 0     order for DME Dynaflex insert 2 Units 0     Ibuprofen-Diphenhydramine Cit (ADVIL PM PO) Take  by mouth. As needed for sleep       Pseudoephedrine HCl (SUDAFED PO) Take  by mouth as needed. For spring and fall allergies.       Loratadine (ALAVERT PO) Take  by mouth as needed. For spring and fall allergies       DiphenhydrAMINE HCl (BENADRYL ALLERGY PO) " Take  by mouth as needed. For spring and fall allergies         multivitamin (THERAGRAN) per tablet take 1 Tab by mouth daily. 100 Tab 12     ASPIRIN 81 MG OR TABS 1 tab po QD (Once per day) 100 3       Physical Exam:    GENL: NAD.    ABD: Soft, non-tender, no masses.    EG: Well-estrogenized, no masses.    VAGINA: Well-estrogenized, no masses.    BN HYPERMOBILITY: Minimal.    CYSTOCELE: Grade 1-2.    APICAL PROLAPSE: Minimal.    RECTOCELE: Minimal.    BIMANUAL: No mass or tenderness.    Cysto:    (Informed consent obtained. Pause for cause performed)   Sterile prep.    17 Fr scope inserted through urethra. Systematic examination w 70 degree lens.   PVR: 10 cc   MUCOSA: Normal without lesion   ORIFICES: Normal location and morphology   CAPACITY: 550 cc; no pain with filling   Scope withdrawn without untoward effect.      (Pt tolerated procedure without difficulty).          Component      Latest Ref Rng & Units 9/14/2017 10/26/2017   Color Urine       Yellow Yellow   Appearance Urine       Slightly Cloudy Clear   Glucose Urine      NEG:Negative mg/dL Negative Negative   Bilirubin Urine      NEG:Negative Negative Negative   Ketones Urine      NEG:Negative mg/dL Negative Negative   Specific Gravity Urine      1.003 - 1.035 1.024 <=1.005   Blood Urine      NEG:Negative Moderate (A) Small (A)   pH Urine      5.0 - 7.0 pH 6.0 5.5   Protein Albumin Urine      NEG:Negative mg/dL 30 (A) Negative   Urobilinogen mg/dL      0.0 - 2.0 mg/dL Normal    Nitrite Urine      NEG:Negative Negative Negative   Leukocyte Esterase Urine      NEG:Negative Large (A) Negative   Source       Midstream Urine Midstream Urine   RBC Urine      OTO2:O - 2 /HPF 29 (H) O - 2   WBC Urine      OTO2:O - 2 /HPF 39 (H) O - 2   Bacteria Urine      NEG:Negative /HPF Few (A)    Squamous Epithelial /HPF Urine      0 - 1 /HPF 3 (H)    Mucous Urine      NEG:Negative /LPF Present (A)    Hyaline Casts      0 - 2 /LPF 25 (H)    Urobilinogen Urine      0.2 - 1.0  EU/dL  0.2   Specimen Description       Midstream Urine    Culture Micro       <10,000 colonies/mL . . .        Results for orders placed or performed in visit on 12/05/17   UA reflex to Microscopic and Culture [[id number]]   Result Value Ref Range    Color Urine Yellow     Appearance Urine Clear     Glucose Urine Negative NEG^Negative mg/dL    Bilirubin Urine Negative NEG^Negative    Ketones Urine Negative NEG^Negative mg/dL    Specific Gravity Urine <=1.005 1.003 - 1.035    Blood Urine Moderate (A) NEG^Negative    pH Urine 6.5 5.0 - 7.0 pH    Protein Albumin Urine Negative NEG^Negative mg/dL    Urobilinogen Urine 0.2 0.2 - 1.0 EU/dL    Nitrite Urine Negative NEG^Negative    Leukocyte Esterase Urine Negative NEG^Negative    Source Midstream Urine    Urine Microscopic   Result Value Ref Range    WBC Urine O - 2 OTO2^O - 2 /HPF    RBC Urine 2-5 (A) OTO2^O - 2 /HPF         IMP:  1. Episodic bladder spasms, large fliuds/caffeine  2. Satisfactory bladder exam        PLAN:  1. Discussed situation with patient in detail.  2. Consider moderation of [place] and coffee/caffeine; discussed in detail; pt expresses understanding  3. Stressed need for UC prior to initiating abx in the future  4. RTC to us only prn  5. 60 minutes spent with patient, more than 50% in counseling and coordination of care for bladder spasms, which did not include time spent for the procedure.  6. Copy CHIDI Bello

## 2018-02-09 ASSESSMENT — ACTIVITIES OF DAILY LIVING (ADL)
I_NEED_ASSISTANCE_FOR_THE_FOLLOWING_DAILY_ACTIVITIES:: NO ASSISTANCE IS NEEDED
CURRENT_FUNCTION: NO ASSISTANCE NEEDED

## 2018-02-12 ENCOUNTER — OFFICE VISIT (OUTPATIENT)
Dept: FAMILY MEDICINE | Facility: CLINIC | Age: 68
End: 2018-02-12
Payer: COMMERCIAL

## 2018-02-12 VITALS
BODY MASS INDEX: 26.58 KG/M2 | DIASTOLIC BLOOD PRESSURE: 73 MMHG | HEART RATE: 66 BPM | SYSTOLIC BLOOD PRESSURE: 125 MMHG | HEIGHT: 63 IN | WEIGHT: 150 LBS | TEMPERATURE: 97.2 F

## 2018-02-12 DIAGNOSIS — E78.00 PURE HYPERCHOLESTEROLEMIA: ICD-10-CM

## 2018-02-12 DIAGNOSIS — Z78.0 POST-MENOPAUSAL: ICD-10-CM

## 2018-02-12 DIAGNOSIS — Z11.59 NEED FOR HEPATITIS C SCREENING TEST: ICD-10-CM

## 2018-02-12 DIAGNOSIS — Z00.00 ROUTINE GENERAL MEDICAL EXAMINATION AT A HEALTH CARE FACILITY: Primary | ICD-10-CM

## 2018-02-12 DIAGNOSIS — Z13.820 SCREENING FOR OSTEOPOROSIS: ICD-10-CM

## 2018-02-12 LAB
CHOLEST SERPL-MCNC: 206 MG/DL
HCV AB SERPL QL IA: NONREACTIVE
HDLC SERPL-MCNC: 62 MG/DL
LDLC SERPL CALC-MCNC: 131 MG/DL
NONHDLC SERPL-MCNC: 144 MG/DL
TRIGL SERPL-MCNC: 66 MG/DL

## 2018-02-12 PROCEDURE — 80061 LIPID PANEL: CPT | Performed by: NURSE PRACTITIONER

## 2018-02-12 PROCEDURE — 36415 COLL VENOUS BLD VENIPUNCTURE: CPT | Performed by: NURSE PRACTITIONER

## 2018-02-12 PROCEDURE — 86803 HEPATITIS C AB TEST: CPT | Performed by: NURSE PRACTITIONER

## 2018-02-12 PROCEDURE — G0402 INITIAL PREVENTIVE EXAM: HCPCS | Performed by: NURSE PRACTITIONER

## 2018-02-12 RX ORDER — SIMVASTATIN 40 MG
40 TABLET ORAL AT BEDTIME
Qty: 90 TABLET | Refills: 3 | Status: SHIPPED | OUTPATIENT
Start: 2018-02-12 | End: 2019-02-08

## 2018-02-12 ASSESSMENT — ACTIVITIES OF DAILY LIVING (ADL): CURRENT_FUNCTION: NO ASSISTANCE NEEDED

## 2018-02-12 NOTE — PROGRESS NOTES
SUBJECTIVE:   Felicitas Avalos is a 67 year old female who presents for Preventive Visit.    Are you in the first 12 months of your Medicare coverage?  Yes,  Visual Acuity:  Right Eye: 10/10   Left Eye: 10/50  Both Eyes: 10/10    Physical   Annual:     Getting at least 3 servings of Calcium per day::  Yes    Bi-annual eye exam::  Yes    Dental care twice a year::  Yes    Sleep apnea or symptoms of sleep apnea::  None    Diet::  Regular (no restrictions)    Frequency of exercise::  6-7 days/week    Duration of exercise::  45-60 minutes    Taking medications regularly::  Yes    Medication side effects::  None    Additional concerns today::  No    Ability to successfully perform activities of daily living: no assistance needed  Home Safety:  Lack of grab bars in the bathroom  Hearing Impairment: no hearing concerns        Fall risk:  Fallen 2 or more times in the past year?: No  Any fall with injury in the past year?: No    COGNITIVE SCREEN  1) Repeat 3 items (Banana, Sunrise, Chair)    2) Clock draw: Normal  3) 3 item recall: Recalls 2 objects   Results: NORMAL clock, 1-2 items recalled: COGNITIVE IMPAIRMENT LESS LIKELY    Mini-CogTM Copyright GABRIEL Rivera. Licensed by the author for use in [facility]; reprinted with permission ([email or web address]). All rights reserved.        Reviewed and updated as needed this visit by clinical staff  Tobacco  Allergies  Meds         Reviewed and updated as needed this visit by Provider        Social History   Substance Use Topics     Smoking status: Never Smoker     Smokeless tobacco: Never Used     Alcohol use Yes      Comment: On rare occasions       Alcohol Use 2/9/2018   If you drink alcohol, do you typically have greater than 3 drinks per day OR greater than 7 drinks per week?   No       Hyperlipidemia Follow-Up      Rate your low fat/cholesterol diet?: good    Taking statin?  Yes, no muscle aches from statin    Other lipid medications/supplements?:  none      Today's  "PHQ-2 Score:   PHQ-2 ( 1999 [employer]) 2/9/2018   Q1: Little interest or pleasure in doing things 0   Q2: Feeling down, depressed or hopeless 0   PHQ-2 Score 0   Q1: Little interest or pleasure in doing things Not at all   Q2: Feeling down, depressed or hopeless Not at all   PHQ-2 Score 0       Do you feel safe in your environment - Yes    Do you have a Health Care Directive?: Yes: Patient states has Advance Directive and will bring in a copy to clinic.    Current providers sharing in care for this patient include:   Patient Care Team:  Ana Bello APRN CNP as PCP - General (Nurse Practitioner)  Ana Bello APRN CNP as Referring Physician (Nurse Practitioner)  Aliza Pace PA as Physician Assistant (Physician Assistant)    The following health maintenance items are reviewed in Epic and correct as of today:  Health Maintenance   Topic Date Due     HEPATITIS C SCREENING  05/12/1970     FALL RISK ASSESSMENT  05/12/2017     DEXA SCAN SCREENING (SYSTEM ASSIGNED)  05/12/2017     PNEUMOCOCCAL (2 of 2 - PPSV23) 09/20/2018     PAP Q3 YR  01/29/2019     MAMMO SCREEN Q2 YR (SYSTEM ASSIGNED)  11/17/2019     LIPID SCREEN Q5 YR FEMALE (SYSTEM ASSIGNED)  12/08/2021     COLONOSCOPY Q5 YR  01/30/2022     ADVANCE DIRECTIVE PLANNING Q5 YRS  05/07/2022     TETANUS IMMUNIZATION (SYSTEM ASSIGNED)  01/29/2026     INFLUENZA VACCINE (SYSTEM ASSIGNED)  Completed           Review of Systems  Constitutional, HEENT, cardiovascular, pulmonary, GI, , musculoskeletal, neuro, skin, endocrine and psych systems are negative, except as otherwise noted.    OBJECTIVE:   /73  Pulse 66  Temp 97.2  F (36.2  C) (Oral)  Ht 5' 3.25\" (1.607 m)  Wt 150 lb (68 kg)  BMI 26.36 kg/m2 Estimated body mass index is 26.36 kg/(m^2) as calculated from the following:    Height as of this encounter: 5' 3.25\" (1.607 m).    Weight as of this encounter: 150 lb (68 kg).  Physical Exam  GENERAL APPEARANCE: healthy, alert and " "no distress  EYES: Eyes grossly normal to inspection, PERRL and conjunctivae and sclerae normal  HENT: ear canals and TM's normal, nose and mouth without ulcers or lesions, oropharynx clear and oral mucous membranes moist  NECK: no adenopathy, no asymmetry, masses, or scars and thyroid normal to palpation  RESP: lungs clear to auscultation - no rales, rhonchi or wheezes  BREAST: normal without masses, tenderness or nipple discharge and no palpable axillary masses or adenopathy  CV: regular rate and rhythm, normal S1 S2, no S3 or S4, no murmur, click or rub, no peripheral edema and peripheral pulses strong  ABDOMEN: soft, nontender, no hepatosplenomegaly, no masses and bowel sounds normal   (female): normal female external genitalia, normal urethral meatus and bimanual exam without masses  MS: no musculoskeletal defects are noted and gait is age appropriate without ataxia  SKIN: no suspicious lesions or rashes  NEURO: Normal strength and tone, sensory exam grossly normal, mentation intact and speech normal  PSYCH: mentation appears normal and affect normal/bright    ASSESSMENT / PLAN:       ICD-10-CM    1. Routine general medical examination at a health care facility Z00.00    2. PURE HYPERCHOLESTEROLEM E78.00 simvastatin (ZOCOR) 40 MG tablet     Lipid panel reflex to direct LDL Fasting   3. Need for hepatitis C screening test Z11.59 Hepatitis C antibody   4. Screening for osteoporosis Z13.820 DX Hip/Pelvis/Spine       End of Life Planning:  Patient currently has an advanced directive: Yes.  Practitioner is supportive of decision.    COUNSELING:  Reviewed preventive health counseling, as reflected in patient instructions        Estimated body mass index is 26.36 kg/(m^2) as calculated from the following:    Height as of this encounter: 5' 3.25\" (1.607 m).    Weight as of this encounter: 150 lb (68 kg).     reports that she has never smoked. She has never used smokeless tobacco.      Appropriate preventive services " were discussed with this patient, including applicable screening as appropriate for cardiovascular disease, diabetes, osteopenia/osteoporosis, and glaucoma.  As appropriate for age/gender, discussed screening for colorectal cancer, prostate cancer, breast cancer, and cervical cancer. Checklist reviewing preventive services available has been given to the patient.    Reviewed patients plan of care and provided an AVS. The Basic Care Plan (routine screening as documented in Health Maintenance) for Felicitas meets the Care Plan requirement. This Care Plan has been established and reviewed with the Patient.    The risks, benefits and treatment options of prescribed medications or other treatments have been discussed with the patient. The patient verbalized their understanding and should call or follow up if no improvement or if they develop further problems.    ANGELITO Madden [facility]

## 2018-02-12 NOTE — PATIENT INSTRUCTIONS
Schedule DEXA scan: [phone]      Preventive Health Recommendations  Female Ages 65 +    Yearly exam:     See your health care provider every year in order to  o Review health changes.   o Discuss preventive care.    o Review your medicines if your doctor has prescribed any.      You no longer need a yearly Pap test unless you've had an abnormal Pap test in the past 10 years. If you have vaginal symptoms, such as bleeding or discharge, be sure to talk with your provider about a Pap test.      Every 1 to 2 years, have a mammogram.  If you are over 69, talk with your health care provider about whether or not you want to continue having screening mammograms.      Every 10 years, have a colonoscopy. Or, have a yearly FIT test (stool test). These exams will check for colon cancer.       Have a cholesterol test every 5 years, or more often if your doctor advises it.       Have a diabetes test (fasting glucose) every three years. If you are at risk for diabetes, you should have this test more often.       At age 65, have a bone density scan (DEXA) to check for osteoporosis (brittle bone disease).    Shots:    Get a flu shot each year.    Get a tetanus shot every 10 years.    Talk to your doctor about your pneumonia vaccines. There are now two you should receive - Pneumovax (PPSV 23) and Prevnar (PCV 13).    Talk to your doctor about the shingles vaccine.    Talk to your doctor about the hepatitis B vaccine.    Nutrition:     Eat at least 5 servings of fruits and vegetables each day.      Eat whole-grain bread, whole-wheat pasta and brown rice instead of white grains and rice.      Talk to your provider about Calcium and Vitamin D.     Lifestyle    Exercise at least 150 minutes a week (30 minutes a day, 5 days a week). This will help you control your weight and prevent disease.      Limit alcohol to one drink per day.      No smoking.       Wear sunscreen to prevent skin cancer.       See your dentist twice a year for  an exam and cleaning.      See your eye doctor every 1 to 2 years to screen for conditions such as glaucoma, macular degeneration, cataracts, etc

## 2018-02-12 NOTE — MR AVS SNAPSHOT
After Visit Summary   2/12/2018    Felicitas Avalos    MRN: [id number]           Patient Information     Date Of Birth          1950        Visit Information        Provider Department      2/12/2018 7:40 AM Ana Bello APRN [facility]        Today's Diagnoses     Routine general medical examination at a health care facility    -  1    PURE HYPERCHOLESTEROLEM        Need for hepatitis C screening test        Screening for osteoporosis          Care Instructions    Schedule DEXA scan: [phone]      Preventive Health Recommendations  Female Ages 65 +    Yearly exam:     See your health care provider every year in order to  o Review health changes.   o Discuss preventive care.    o Review your medicines if your doctor has prescribed any.      You no longer need a yearly Pap test unless you've had an abnormal Pap test in the past 10 years. If you have vaginal symptoms, such as bleeding or discharge, be sure to talk with your provider about a Pap test.      Every 1 to 2 years, have a mammogram.  If you are over 69, talk with your health care provider about whether or not you want to continue having screening mammograms.      Every 10 years, have a colonoscopy. Or, have a yearly FIT test (stool test). These exams will check for colon cancer.       Have a cholesterol test every 5 years, or more often if your doctor advises it.       Have a diabetes test (fasting glucose) every three years. If you are at risk for diabetes, you should have this test more often.       At age 65, have a bone density scan (DEXA) to check for osteoporosis (brittle bone disease).    Shots:    Get a flu shot each year.    Get a tetanus shot every 10 years.    Talk to your doctor about your pneumonia vaccines. There are now two you should receive - Pneumovax (PPSV 23) and Prevnar (PCV 13).    Talk to your doctor about the shingles vaccine.    Talk to your doctor about the hepatitis B  vaccine.    Nutrition:     Eat at least 5 servings of fruits and vegetables each day.      Eat whole-grain bread, whole-wheat pasta and brown rice instead of white grains and rice.      Talk to your provider about Calcium and Vitamin D.     Lifestyle    Exercise at least 150 minutes a week (30 minutes a day, 5 days a week). This will help you control your weight and prevent disease.      Limit alcohol to one drink per day.      No smoking.       Wear sunscreen to prevent skin cancer.       See your dentist twice a year for an exam and cleaning.      See your eye doctor every 1 to 2 years to screen for conditions such as glaucoma, macular degeneration, cataracts, etc           Follow-ups after your visit        Future tests that were ordered for you today     Open Future Orders        Priority Expected Expires Ordered    DX Hip/Pelvis/Spine Routine  2/12/2019 2/12/2018            Who to contact     If you have questions or need follow up information about today's clinic visit or your schedule please contact [facility] directly at [phone].  Normal or non-critical lab and imaging results will be communicated to you by [employer], letter or phone within 4 business days after the clinic has received the results. If you do not hear from us within 7 days, please contact the clinic through [employer] or phone. If you have a critical or abnormal lab result, we will notify you by phone as soon as possible.  Submit refill requests through [employer] or call your pharmacy and they will forward the refill request to us. Please allow 3 business days for your refill to be completed.          Additional Information About Your Visit        [employer] Information     [employer] gives you secure access to your electronic health record. If you see a primary care provider, you can also send messages to your care team and make appointments. If you have questions, please call your primary care clinic.  If you do not have a primary  "care provider, please call [phone] and they will assist you.        Care EveryWhere ID     This is your Care EveryWhere ID. This could be used by other organizations to access your [place] medical records  [id number]        Your Vitals Were     Pulse Temperature Height BMI (Body Mass Index)          66 97.2  F (36.2  C) (Oral) 5' 3.25\" (1.607 m) 26.36 kg/m2         Blood Pressure from Last 3 Encounters:   02/12/18 125/73   12/05/17 118/79   10/26/17 108/67    Weight from Last 3 Encounters:   02/12/18 150 lb (68 kg)   12/05/17 148 lb (67.1 kg)   10/26/17 148 lb (67.1 kg)              We Performed the Following     Hepatitis C antibody     Lipid panel reflex to direct LDL Fasting          Where to get your medicines      These medications were sent to [facility] [address]  [address] [address]     Phone:  [phone]     simvastatin 40 MG tablet          Primary Care Provider Office Phone # Fax #    Ana Danielson Jono Bello, ANGELITO [facility] [phone]       [address] [address]        Equal Access to Services     GUSTAVO MATSON AH: Hadii aad ku hadasho Soomaali, waaxda luqadaha, qaybta kaalmada adeegyada, waxay ramonain haystann alicia campuzano. So [facility] [phone].    ATENCIÓN: Si habla español, tiene a bullock disposición servicios gratuitos de asistencia lingüística. Llame al [phone].    We comply with applicable federal civil rights laws and [place] laws. We do not discriminate on the basis of race, color, national origin, age, disability, sex, sexual orientation, or gender identity.            Thank you!     Thank you for choosing [facility]  for your care. Our goal is always to provide you with excellent care. Hearing back from our patients is one way we can continue to improve our services. Please take a few minutes to complete the written survey that you may receive in the mail after your visit with us. " Thank you!             Your Updated Medication List - Protect others around you: Learn how to safely use, store and throw away your medicines at www.disposemymeds.org.          This list is accurate as of 2/12/18  8:03 AM.  Always use your most recent med list.                   Brand Name Dispense Instructions for use Diagnosis    ADVIL PM PO      Take  by mouth. As needed for sleep        ALAVERT PO      Take  by mouth as needed. For spring and fall allergies        aspirin 81 MG tablet     100    1 tab po QD (Once per day)    Pure hypercholesterolemia       BENADRYL ALLERGY PO      Take  by mouth as needed. For spring and fall allergies        multivitamin per tablet     100 Tab    take 1 Tab by mouth daily.        * order for DME     2 Units    Dynaflex insert    Bilateral foot pain, Hallux rigidus of both feet       * order for DME     1 Device    Equipment being ordered: Dynaflex inserts    Hallux rigidus of right foot       simvastatin 40 MG tablet    ZOCOR    90 tablet    Take 1 tablet (40 mg) by mouth At Bedtime at bedtime.    Pure hypercholesterolemia       SUDAFED PO      Take  by mouth as needed. For spring and fall allergies.        * Notice:  This list has 2 medication(s) that are the same as other medications prescribed for you. Read the directions carefully, and ask your doctor or other care provider to review them with you.

## 2018-02-12 NOTE — NURSING NOTE
"Chief Complaint   Patient presents with     Physical       Initial /73  Pulse 66  Temp 97.2  F (36.2  C) (Oral)  Ht 5' 3.25\" (1.607 m)  Wt 150 lb (68 kg)  BMI 26.36 kg/m2 Estimated body mass index is 26.36 kg/(m^2) as calculated from the following:    Height as of this encounter: 5' 3.25\" (1.607 m).    Weight as of this encounter: 150 lb (68 kg).  Medication Reconciliation: complete  "

## 2018-02-22 ENCOUNTER — HOSPITAL ENCOUNTER (OUTPATIENT)
Dept: BONE DENSITY | Facility: CLINIC | Age: 68
Discharge: HOME | End: 2018-02-22
Attending: NURSE PRACTITIONER | Admitting: NURSE PRACTITIONER
Payer: MEDICARE

## 2018-02-22 DIAGNOSIS — Z78.0 POST-MENOPAUSAL: ICD-10-CM

## 2018-02-22 PROCEDURE — 77080 DXA BONE DENSITY AXIAL: CPT

## 2018-08-29 ENCOUNTER — HOSPITAL ENCOUNTER (EMERGENCY)
Facility: CLINIC | Age: 68
Discharge: HOME | End: 2018-08-29
Attending: PHYSICIAN ASSISTANT | Admitting: PHYSICIAN ASSISTANT
Payer: MEDICARE

## 2018-08-29 VITALS — SYSTOLIC BLOOD PRESSURE: 115 MMHG | DIASTOLIC BLOOD PRESSURE: 69 MMHG | OXYGEN SATURATION: 98 % | TEMPERATURE: 98.3 F

## 2018-08-29 DIAGNOSIS — N30.01 ACUTE CYSTITIS WITH HEMATURIA: ICD-10-CM

## 2018-08-29 LAB
ALBUMIN UR-MCNC: 30 MG/DL
APPEARANCE UR: ABNORMAL
BILIRUB UR QL STRIP: NEGATIVE
COLOR UR AUTO: YELLOW
GLUCOSE UR STRIP-MCNC: NEGATIVE MG/DL
HGB UR QL STRIP: ABNORMAL
KETONES UR STRIP-MCNC: NEGATIVE MG/DL
LEUKOCYTE ESTERASE UR QL STRIP: ABNORMAL
MUCOUS THREADS #/AREA URNS LPF: PRESENT /LPF
NITRATE UR QL: NEGATIVE
PH UR STRIP: 6 PH (ref 5–7)
RBC #/AREA URNS AUTO: >182 /HPF (ref 0–2)
SOURCE: ABNORMAL
SP GR UR STRIP: 1.01 (ref 1–1.03)
SQUAMOUS #/AREA URNS AUTO: 1 /HPF (ref 0–1)
UROBILINOGEN UR STRIP-MCNC: 0 MG/DL (ref 0–2)
WBC #/AREA URNS AUTO: >182 /HPF (ref 0–5)

## 2018-08-29 PROCEDURE — 81001 URINALYSIS AUTO W/SCOPE: CPT | Performed by: PHYSICIAN ASSISTANT

## 2018-08-29 PROCEDURE — 99214 OFFICE O/P EST MOD 30 MIN: CPT | Mod: Z6 | Performed by: PHYSICIAN ASSISTANT

## 2018-08-29 PROCEDURE — 87086 URINE CULTURE/COLONY COUNT: CPT | Performed by: PHYSICIAN ASSISTANT

## 2018-08-29 PROCEDURE — G0463 HOSPITAL OUTPT CLINIC VISIT: HCPCS | Performed by: PHYSICIAN ASSISTANT

## 2018-08-29 RX ORDER — SULFAMETHOXAZOLE/TRIMETHOPRIM 800-160 MG
1 TABLET ORAL 2 TIMES DAILY
Qty: 14 TABLET | Refills: 0 | Status: SHIPPED | OUTPATIENT
Start: 2018-08-29 | End: 2018-09-05

## 2018-08-29 NOTE — DISCHARGE INSTRUCTIONS
"    *BLADDER INFECTION,Female (Adult)    A bladder infection (\"cystitis\" or \"UTI\") usually causes a constant urge to urinate and a burning when passing urine. Urine may be cloudy, smelly or dark. There may be pain in the lower abdomen. A bladder infection occurs when bacteria from the vaginal area enter the bladder opening (urethra). This can occur from sexual intercourse, wearing tight clothing, dehydration and other factors.  HOME CARE:  1. Drink lots of fluids (at least 6-8 glasses a day, unless you must restrict fluids for other medical reasons). This will force the medicine into your urinary system and flush the bacteria out of your body. Cranberry juice has been shown to help clear out the bacteria.  2. Avoid sexual intercourse until your symptoms are gone.  3. A bladder infection is treated with antibiotics. You may also be given Pyridium (generic = phenazopyridine) to reduce the burning sensation. This medicine will cause your urine to become a bright orange color. The orange urine may stain clothing. You may wear a pad or panty-liner to protect clothing.  PREVENTING FUTURE INFECTIONS:  1. Always wipe from front to back after a bowel movement.  2. Keep the genital area clean and dry.  3. Drink plenty of fluids each day to avoid dehydration.  4. Urinate right after intercourse to flush out the bladder.  5. Wear cotton underwear and cotton-lined panty hose; avoid tight-fitting pants.  6. If you are on birth control pills and are having frequent bladder infections, discuss with your doctor.  FOLLOW UP: Return to this facility or see your doctor if ALL symptoms are not gone after three days of treatment.  GET PROMPT MEDICAL ATTENTION if any of the following occur:    Fever over 101 F (38.3 C)    No improvement by the third day of treatment    Increasing back or abdominal pain    Repeated vomiting; unable to keep medicine down    Weakness, dizziness or fainting    Vaginal discharge    Pain, redness or swelling in " the labia (outer vaginal area)    5751-2855 [employer], [address] [place] [address], [place] [address]. All rights reserved. This information is not intended as a substitute for professional medical care. Always follow your healthcare professional's instructions.

## 2018-08-29 NOTE — ED AVS SNAPSHOT
[facility] Emergency Department    [address] [facility] [address]    Phone:  [phone]    Fax:  [phone]                                       Felicitas Avalos   MRN: [id number]    Department:  [facility] Emergency Department   Date of Visit:  8/29/2018           After Visit Summary Signature Page     I have received my discharge instructions, and my questions have been answered. I have discussed any challenges I see with this plan with the nurse or doctor.    ..........................................................................................................................................  Patient/Patient Representative Signature      ..........................................................................................................................................  Patient Representative Print Name and Relationship to Patient    ..................................................               ................................................  Date                                            Time    ..........................................................................................................................................  Reviewed by Signature/Title    ...................................................              ..............................................  Date                                                            Time          22EPIC Rev 08/18

## 2018-08-29 NOTE — ED PROVIDER NOTES
History     Chief Complaint   Patient presents with     UTI     HPI  Felicitas Avalos is a 68 year old female who presents to urgent care with concern for possible urinary tract infection.  For the last 3 days patient has had dysuria, increased frequency, urgency, voiding in small amounts and no hematuria this morning.  She also notes some vaginal itching, chills myalgias.  She has not had any fever, nausea, vomiting, diarrhea abdominal or flank pain.  She has had a history of urinary tract infections previously and this feels similar.  She has not had any prior history of pyelonephritis or nephrolithiasis.    Problem List:    Patient Active Problem List    Diagnosis Date Noted     Tubular adenoma of colon 02/02/2017     Priority: Medium     On colonoscopy 1/30/2017 - repeat in 5 years       ACP (advance care planning) 12/08/2016     Priority: Medium     Advance Care Planning 5/7/2017: Receipt of ACP document:  Received: Health Care Directive which was witnessed or notarized on 1/19/17.  Document previously scanned on 2/8/17.  Validation form completed and scanned.  Code Status reflects choices in most recent ACP document..  Confirmed/documented designated decision maker(s).  Added by Colleen Muryr RN, Advance Care Planning Liaison.  Advance Care Planning 12/8/2016: ACP Review of Chart / Resources Provided:  Reviewed chart for advance care plan.  Felicitas Avalos has been provided information and resources to begin or update their advance care plan.  Added by Archana Rahman  Advance Care Planning 11/29/2011: Discussed advance care planning with patient; information given to patient to review.          HYPERLIPIDEMIA LDL GOAL <130 10/31/2010     Priority: Medium     Sleep related leg cramps 05/18/2007     Priority: Medium     Pure hypercholesterolemia 06/24/2005     Priority: Medium     On lipitor - well controlled        Allergic rhinitis due to other allergen 06/24/2005     Priority: Medium     Stable with OTC  meds         Bunion 06/24/2005     Priority: Medium     Managed with appropriate shoes            Past Medical History:    History reviewed. No pertinent past medical history.    Past Surgical History:    Past Surgical History:   Procedure Laterality Date     COLONOSCOPY  2003     Normal       Family History:    Family History   Problem Relation Age of Onset     C.A.D. Father      MI     Asthma Father      Hyperlipidemia Father      C.A.D. Maternal Grandmother      MI     Alcohol/Drug Maternal Grandfather      Alcohol/Drug Brother      C.A.D. Brother      Hypertension Brother      Lipids Brother      C.A.D. Brother      Anesthesia Reaction Brother      Hyperlipidemia Brother      C.A.D. Brother      Stent placed at age 51.     Lipids Brother      HEART DISEASE Brother      heart attack     Asthma Brother      Chemical Addiction Brother      Hyperlipidemia Brother      Cancer Brother 72     pancreatic     Other Cancer Brother      Pancreatic     Neurologic Disorder Mother      mild dementia     HEART DISEASE Mother      afib     Depression/Anxiety Daughter      Mental Illness Daughter      Eating disorder     Asthma Sister      Diabetes No family hx of      Coronary Artery Disease No family hx of      Breast Cancer No family hx of      Cancer - colorectal No family hx of      Ovarian Cancer No family hx of      Prostate Cancer No family hx of      Cerebrovascular Disease No family hx of      Thyroid Disease No family hx of      Osteoperosis No family hx of      Known Genetic Syndrome No family hx of      Social History:  Marital Status:   [2]  Social History   Substance Use Topics     Smoking status: Never Smoker     Smokeless tobacco: Never Used     Alcohol use Yes      Comment: On rare occasions      Medications:      ASPIRIN 81 MG OR TABS   DiphenhydrAMINE HCl (BENADRYL ALLERGY PO)   Ibuprofen-Diphenhydramine Cit (ADVIL PM PO)   Loratadine (ALAVERT PO)   multivitamin (THERAGRAN) per tablet   order for DME    order for DME   Pseudoephedrine HCl (SUDAFED PO)   simvastatin (ZOCOR) 40 MG tablet     Review of Systems  CONSTITUTIONAL:POSITIVE  for chills and myalgias and NEGATIVE  for fever  INTEGUMENTARY/SKIN: NEGATIVE for worrisome rashes, moles or lesions  RESP:NEGATIVE for significant cough or SOB  GI: NEGATIVE for abdominal pain, diarrhea, nausea and vomiting  : POSITIVE for dysuria, increased frequency, urgency, hematuria, voiding in small amounts, vaginal itching NEGATIVE for vaginal discharge  Physical Exam   BP: 115/69  Heart Rate: 74  Temp: 98.3  F (36.8  C)  SpO2: 98 %  Physical Exam  GENERAL APPEARANCE: healthy, alert and no distress  RESP: lungs clear to auscultation - no rales, rhonchi or wheezes  CV: regular rates and rhythm, normal S1 S2, no murmur noted  ABDOMEN:  soft, nontender, no HSM or masses and bowel sounds normal  BACK: No CVA tenderness  SKIN: no suspicious lesions or rashes  ED Course     ED Course     Procedures        Critical Care time:  none        Results for orders placed or performed during the hospital encounter of 08/29/18 (from the past 24 hour(s))   UA with Microscopic   Result Value Ref Range    Color Urine Yellow     Appearance Urine Cloudy     Glucose Urine Negative NEG^Negative mg/dL    Bilirubin Urine Negative NEG^Negative    Ketones Urine Negative NEG^Negative mg/dL    Specific Gravity Urine 1.015 1.003 - 1.035    Blood Urine Large (A) NEG^Negative    pH Urine 6.0 5.0 - 7.0 pH    Protein Albumin Urine 30 (A) NEG^Negative mg/dL    Urobilinogen mg/dL 0.0 0.0 - 2.0 mg/dL    Nitrite Urine Negative NEG^Negative    Leukocyte Esterase Urine Large (A) NEG^Negative    Source Midstream Urine     WBC Urine >182 (H) 0 - 5 /HPF    RBC Urine >182 (H) 0 - 2 /HPF    Squamous Epithelial /HPF Urine 1 0 - 1 /HPF    Mucous Urine Present (A) NEG^Negative /LPF     Medications - No data to display  Assessments & Plan (with Medical Decision Making)     I have reviewed the nursing notes.    I have  reviewed the findings, diagnosis, plan and need for follow up with the patient.       New Prescriptions    SULFAMETHOXAZOLE-TRIMETHOPRIM (BACTRIM DS) 800-160 MG PER TABLET    Take 1 tablet by mouth 2 times daily for 7 days     Final diagnoses:   Acute cystitis with hematuria     Urinalysis positive for infection.  No signs or symptoms concerning for pyelonephritis or nephrolithiasis at this time.  Patient will be discharged home stable on bactrim pending urine culture results from today's visit.  She was instructed to follow up with PCP if no improvement in three days.  Worrisome reasons to seek care sooner discussed.      Disclaimer: This note consists of symbols derived from keyboarding, dictation, and/or voice recognition software. As a result, there may be errors in the script that have gone undetected.  Please consider this when interpreting information found in the chart.    8/29/2018   [facility] EMERGENCY DEPARTMENT     Sharyn Hart PA-C  08/29/18 1244

## 2018-08-29 NOTE — ED AVS SNAPSHOT
" [facility] Emergency Department    [address] [facility] [address]    Phone:  [phone]    Fax:  [phone]                                       Felicitas Avalos   MRN: [id number]    Department:  [facility] Emergency Department   Date of Visit:  8/29/2018           Patient Information     Date Of Birth          1950        Your diagnoses for this visit were:     Acute cystitis with hematuria        You were seen by Sharyn Hart PA-C.      Follow-up Information     Follow up with Ana Bello APRN CNP In 3 days.    Specialty:  Nurse Practitioner    Why:  if no improvement or sooner if new or worsening symptoms     Contact information:    [address] [facility] [id number] [phone]          Discharge Instructions           *BLADDER INFECTION,Female (Adult)    A bladder infection (\"cystitis\" or \"UTI\") usually causes a constant urge to urinate and a burning when passing urine. Urine may be cloudy, smelly or dark. There may be pain in the lower abdomen. A bladder infection occurs when bacteria from the vaginal area enter the bladder opening (urethra). This can occur from sexual intercourse, wearing tight clothing, dehydration and other factors.  HOME CARE:  1. Drink lots of fluids (at least 6-8 glasses a day, unless you must restrict fluids for other medical reasons). This will force the medicine into your urinary system and flush the bacteria out of your body. Cranberry juice has been shown to help clear out the bacteria.  2. Avoid sexual intercourse until your symptoms are gone.  3. A bladder infection is treated with antibiotics. You may also be given Pyridium (generic = phenazopyridine) to reduce the burning sensation. This medicine will cause your urine to become a bright orange color. The orange urine may stain clothing. You may wear a pad or panty-liner to protect clothing.  PREVENTING FUTURE INFECTIONS:  1. Always wipe from front to back after a bowel " movement.  2. Keep the genital area clean and dry.  3. Drink plenty of fluids each day to avoid dehydration.  4. Urinate right after intercourse to flush out the bladder.  5. Wear cotton underwear and cotton-lined panty hose; avoid tight-fitting pants.  6. If you are on birth control pills and are having frequent bladder infections, discuss with your doctor.  FOLLOW UP: Return to this facility or see your doctor if ALL symptoms are not gone after three days of treatment.  GET PROMPT MEDICAL ATTENTION if any of the following occur:    Fever over 101 F (38.3 C)    No improvement by the third day of treatment    Increasing back or abdominal pain    Repeated vomiting; unable to keep medicine down    Weakness, dizziness or fainting    Vaginal discharge    Pain, redness or swelling in the labia (outer vaginal area)    6364-8128 [employer], [address] [place] [address]. All rights reserved. This information is not intended as a substitute for professional medical care. Always follow your healthcare professional's instructions.      24 Hour Appointment Hotline       To make an appointment at any [facility], call [phone] ([phone]). If you don't have a family doctor or clinic, we will help you find one. [facility] clinics are conveniently located to serve the needs of you and your family.             Review of your medicines      START taking        Dose / Directions Last dose taken    sulfamethoxazole-trimethoprim 800-160 MG per tablet   Commonly known as:  BACTRIM DS   Dose:  1 tablet   Quantity:  14 tablet        Take 1 tablet by mouth 2 times daily for 7 days   Refills:  0          Our records show that you are taking the medicines listed below. If these are incorrect, please call your family doctor or clinic.        Dose / Directions Last dose taken    ADVIL PM PO        Take  by mouth. As needed for sleep   Refills:  0        ALAVERT PO        Take  by mouth as needed. For spring  and fall allergies   Refills:  0        aspirin 81 MG tablet   Quantity:  100        1 tab po QD (Once per day)   Refills:  3        BENADRYL ALLERGY PO        Take  by mouth as needed. For spring and fall allergies   Refills:  0        multivitamin per tablet   Dose:  1 tablet   Quantity:  100 Tab        take 1 Tab by mouth daily.   Refills:  12        * order for DME   Quantity:  2 Units        Dynaflex insert   Refills:  0        * order for DME   Quantity:  1 Device        Equipment being ordered: Dynaflex inserts   Refills:  0        simvastatin 40 MG tablet   Commonly known as:  ZOCOR   Dose:  40 mg   Quantity:  90 tablet        Take 1 tablet (40 mg) by mouth At Bedtime at bedtime.   Refills:  3        SUDAFED PO        Take  by mouth as needed. For spring and fall allergies.   Refills:  0        * Notice:  This list has 2 medication(s) that are the same as other medications prescribed for you. Read the directions carefully, and ask your doctor or other care provider to review them with you.            Prescriptions were sent or printed at these locations (1 Prescription)                   [facility] [address]   [address] [facility] [address]    Telephone:  [phone]   Fax:  [phone]   Hours:                  E-Prescribed (1 of 1)         sulfamethoxazole-trimethoprim (BACTRIM DS) 800-160 MG per tablet                Procedures and tests performed during your visit     UA with Microscopic    Urine Culture      Orders Needing Specimen Collection     None      Pending Results     Date and Time Order Name Status Description    8/29/2018 1212 Urine Culture In process             Pending Culture Results     Date and Time Order Name Status Description    8/29/2018 1212 Urine Culture In process             Pending Results Instructions     If you had any lab results that were not finalized at the time of your Discharge, you can call the ED Lab Result RN at  [phone]. You will be contacted by this team for any positive Lab results or changes in treatment. The nurses are available 7 days a week from 10A to 6:30P.  You can leave a message 24 hours per day and they will return your call.        Test Results From Your Hospital Stay        8/29/2018 12:13 PM         8/29/2018 12:33 PM      Component Results     Component Value Ref Range & Units Status    Color Urine Yellow  Final    Appearance Urine Cloudy  Final    Glucose Urine Negative NEG^Negative mg/dL Final    Bilirubin Urine Negative NEG^Negative Final    Ketones Urine Negative NEG^Negative mg/dL Final    Specific Gravity Urine 1.015 1.003 - 1.035 Final    Blood Urine Large (A) NEG^Negative Final    pH Urine 6.0 5.0 - 7.0 pH Final    Protein Albumin Urine 30 (A) NEG^Negative mg/dL Final    Urobilinogen mg/dL 0.0 0.0 - 2.0 mg/dL Final    Nitrite Urine Negative NEG^Negative Final    Leukocyte Esterase Urine Large (A) NEG^Negative Final    Source Midstream Urine  Final    WBC Urine >182 (H) 0 - 5 /HPF Final    RBC Urine >182 (H) 0 - 2 /HPF Final    Squamous Epithelial /HPF Urine 1 0 - 1 /HPF Final    Mucous Urine Present (A) NEG^Negative /LPF Final                Thank you for choosing [facility]       Thank you for choosing [facility] for your care. Our goal is always to provide you with excellent care. Hearing back from our patients is one way we can continue to improve our services. Please take a few minutes to complete the written survey that you may receive in the mail after you visit with us. Thank you!        [employer] Information     [employer] gives you secure access to your electronic health record. If you see a primary care provider, you can also send messages to your care team and make appointments. If you have questions, please call your primary care clinic.  If you do not have a primary care provider, please call [phone] and they will assist you.        Care EveryWhere ID     This is your Care EveryWhere  ID. This could be used by other organizations to access your [facility] medical records  [id number]        Equal Access to Services     GUSTAVO MATSON : Sophie Zepeda, harsh gregory, susie mayes. So [facility] [phone].    ATENCIÓN: Si habla español, tiene a bullock disposición servicios gratuitos de asistencia lingüística. Llame al [phone].    We comply with applicable federal civil rights laws and [place] laws. We do not discriminate on the basis of race, color, national origin, age, disability, sex, sexual orientation, or gender identity.            After Visit Summary       This is your record. Keep this with you and show to your community pharmacist(s) and doctor(s) at your next visit.

## 2018-08-30 LAB
BACTERIA SPEC CULT: NORMAL
Lab: NORMAL
SPECIMEN SOURCE: NORMAL

## 2018-10-12 ENCOUNTER — ALLIED HEALTH/NURSE VISIT (OUTPATIENT)
Dept: FAMILY MEDICINE | Facility: CLINIC | Age: 68
End: 2018-10-12
Payer: COMMERCIAL

## 2018-10-12 DIAGNOSIS — Z23 NEED FOR VACCINATION: ICD-10-CM

## 2018-10-12 DIAGNOSIS — Z23 NEED FOR PROPHYLACTIC VACCINATION AND INOCULATION AGAINST INFLUENZA: Primary | ICD-10-CM

## 2018-10-12 PROCEDURE — 99207 ZZC NO CHARGE NURSE ONLY: CPT

## 2018-10-12 PROCEDURE — G0008 ADMIN INFLUENZA VIRUS VAC: HCPCS

## 2018-10-12 PROCEDURE — 90662 IIV NO PRSV INCREASED AG IM: CPT

## 2018-10-12 PROCEDURE — G0009 ADMIN PNEUMOCOCCAL VACCINE: HCPCS

## 2018-10-12 PROCEDURE — 90732 PPSV23 VACC 2 YRS+ SUBQ/IM: CPT

## 2018-10-12 NOTE — MR AVS SNAPSHOT
After Visit Summary   10/12/2018    Felicitas Avalos    MRN: [id number]           Patient Information     Date Of Birth          1950        Visit Information        Provider Department      10/12/2018 10:15 AM FL [place] NATE/IM KIRSTEN/LPN [facility]        Today's Diagnoses     Need for prophylactic vaccination and inoculation against influenza    -  1    Need for vaccination           Follow-ups after your visit        Who to contact     If you have questions or need follow up information about today's clinic visit or your schedule please contact [facility] directly at [phone].  Normal or non-critical lab and imaging results will be communicated to you by [employer], letter or phone within 4 business days after the clinic has received the results. If you do not hear from us within 7 days, please contact the clinic through [employer] or phone. If you have a critical or abnormal lab result, we will notify you by phone as soon as possible.  Submit refill requests through [employer] or call your pharmacy and they will forward the refill request to us. Please allow 3 business days for your refill to be completed.          Additional Information About Your Visit        MyChart Information     [employer] gives you secure access to your electronic health record. If you see a primary care provider, you can also send messages to your care team and make appointments. If you have questions, please call your primary care clinic.  If you do not have a primary care provider, please call [phone] and they will assist you.        Care EveryWhere ID     This is your Care EveryWhere ID. This could be used by other organizations to access your [facility] medical records  [id number]         Blood Pressure from Last 3 Encounters:   08/29/18 115/69   02/12/18 125/73   12/05/17 118/79    Weight from Last 3 Encounters:   02/12/18 150 lb (68 kg)   12/05/17 148 lb (67.1 kg)   10/26/17 148 lb (67.1 kg)               We Performed the Following     ADMIN INFLUENZA (For MEDICARE Patients ONLY) []     ADMIN PNEUMOCOCCAL VACCINE (For MEDICARE Patients ONLY) []     FLU VACCINE, INCREASED ANTIGEN, PRESV FREE, AGE 65+ [89073]     Pneumococcal vaccine 23 valent PPSV23  (Pneumovax) [82820]        Primary Care Provider Office Phone # Fax #    Ana Bello, ANGELITO [phone] [address] [facility] [address]        Equal Access to Services     GUSTAVO MATSON : Hadii aad ku hadasho Soomaali, waaxda luqadaha, qaybta kaalmada adeegyada, waxay idiin hayaan adeeg khkrystal don . So [facility] [phone].    ATENCIÓN: Si habla español, tiene a bullock disposición servicios gratuitos de asistencia lingüística. Llame al [phone].    We comply with applicable federal civil rights laws and [place] laws. We do not discriminate on the basis of race, color, national origin, age, disability, sex, sexual orientation, or gender identity.            Thank you!     Thank you for choosing [facility]  for your care. Our goal is always to provide you with excellent care. Hearing back from our patients is one way we can continue to improve our services. Please take a few minutes to complete the written survey that you may receive in the mail after your visit with us. Thank you!             Your Updated Medication List - Protect others around you: Learn how to safely use, store and throw away your medicines at www.disposemymeds.org.          This list is accurate as of 10/12/18 10:37 AM.  Always use your most recent med list.                   Brand Name Dispense Instructions for use Diagnosis    ADVIL PM PO      Take  by mouth. As needed for sleep        ALAVERT PO      Take  by mouth as needed. For spring and fall allergies        aspirin 81 MG tablet     100    1 tab po QD (Once per day)    Pure hypercholesterolemia       BENADRYL ALLERGY PO      Take  by mouth as needed. For spring and  fall allergies        multivitamin per tablet     100 Tab    take 1 Tab by mouth daily.        * order for DME     2 Units    Dynaflex insert    Bilateral foot pain, Hallux rigidus of both feet       * order for DME     1 Device    Equipment being ordered: Dynaflex inserts    Hallux rigidus of right foot       simvastatin 40 MG tablet    ZOCOR    90 tablet    Take 1 tablet (40 mg) by mouth At Bedtime at bedtime.    Pure hypercholesterolemia       SUDAFED PO      Take  by mouth as needed. For spring and fall allergies.        * Notice:  This list has 2 medication(s) that are the same as other medications prescribed for you. Read the directions carefully, and ask your doctor or other care provider to review them with you.

## 2018-10-12 NOTE — PROGRESS NOTES

## 2018-10-17 ENCOUNTER — MYC MEDICAL ADVICE (OUTPATIENT)
Dept: FAMILY MEDICINE | Facility: CLINIC | Age: 68
End: 2018-10-17

## 2018-12-05 ENCOUNTER — RADIANT APPOINTMENT (OUTPATIENT)
Dept: GENERAL RADIOLOGY | Facility: CLINIC | Age: 68
End: 2018-12-05
Attending: NURSE PRACTITIONER
Payer: COMMERCIAL

## 2018-12-05 ENCOUNTER — OFFICE VISIT (OUTPATIENT)
Dept: FAMILY MEDICINE | Facility: CLINIC | Age: 68
End: 2018-12-05
Payer: COMMERCIAL

## 2018-12-05 VITALS
RESPIRATION RATE: 12 BRPM | DIASTOLIC BLOOD PRESSURE: 60 MMHG | HEART RATE: 74 BPM | OXYGEN SATURATION: 98 % | TEMPERATURE: 97.4 F | WEIGHT: 148 LBS | BODY MASS INDEX: 26.22 KG/M2 | SYSTOLIC BLOOD PRESSURE: 108 MMHG | HEIGHT: 63 IN

## 2018-12-05 DIAGNOSIS — R07.81 RIB PAIN: ICD-10-CM

## 2018-12-05 DIAGNOSIS — G89.29 CHRONIC RIGHT SHOULDER PAIN: ICD-10-CM

## 2018-12-05 DIAGNOSIS — M25.511 CHRONIC RIGHT SHOULDER PAIN: ICD-10-CM

## 2018-12-05 DIAGNOSIS — R07.81 RIB PAIN: Primary | ICD-10-CM

## 2018-12-05 PROCEDURE — 99213 OFFICE O/P EST LOW 20 MIN: CPT | Performed by: NURSE PRACTITIONER

## 2018-12-05 PROCEDURE — 71101 X-RAY EXAM UNILAT RIBS/CHEST: CPT | Mod: RT

## 2018-12-05 NOTE — PATIENT INSTRUCTIONS
Thank you for choosing [facility].  You may be receiving a survey in the mail from Deirdre Zendejas regarding your visit today.  Please take a few minutes to complete and return the survey to let us know how we are doing.      If you have questions or concerns, please contact us via [employer] or you can contact your care team at [phone].    Our Clinic hours are:  Monday 6:40 am  to 7:00 pm  Tuesday -Friday 6:40 am to 5:00 pm    The [place] outpatient lab hours are:  Monday - Friday 6:10 am to 4:45 pm  Saturdays 7:00 am to 11:00 am  Appointments are required, call [phone]    If you have clinical questions after hours or would like to schedule an appointment,  call the clinic at [phone].

## 2018-12-05 NOTE — MR AVS SNAPSHOT
After Visit Summary   12/5/2018    Felicitas Avalos    MRN: [id number]           Patient Information     Date Of Birth          1950        Visit Information        Provider Department      12/5/2018 10:40 AM Ana Bello APRN [facility]        Today's Diagnoses     Rib pain    -  1    Chronic right shoulder pain          Care Instructions          Thank you for choosing [facility].  You may be receiving a survey in the mail from Deirdre Zendejas regarding your visit today.  Please take a few minutes to complete and return the survey to let us know how we are doing.      If you have questions or concerns, please contact us via [employer] or you can contact your care team at [phone].    Our Clinic hours are:  Monday 6:40 am  to 7:00 pm  Tuesday -Friday 6:40 am to 5:00 pm    The [place] outpatient lab hours are:  Monday - Friday 6:10 am to 4:45 pm  Saturdays 7:00 am to 11:00 am  Appointments are required, call [phone]    If you have clinical questions after hours or would like to schedule an appointment,  call the clinic at [phone].            Follow-ups after your visit        Additional Services     ORTHO  REFERRAL       [facility] is referring you to the Orthopedic  Services at [facility].       The  Representative will assist you in the coordination of your Orthopedic and Musculoskeletal Care as prescribed by your physician.    The  Representative will call you within 1 business day to help schedule your appointment, or you may contact the  Representative at:    All areas ~ [phone]     Type of Referral : Non Surgical / Sport Medicine       Timeframe requested: 3 - 5 days    Coverage of these services is subject to the terms and limitations of your health insurance plan.  Please call member services at your health plan with any benefit or coverage questions.     "  If X-rays, CT or MRI's have been performed, please contact the facility where they were done to arrange for , prior to your scheduled appointment.  Please bring this referral request to your appointment and present it to your specialist.                  Follow-up notes from your care team     Return in about 4 weeks (around 1/2/2019), or if symptoms worsen or fail to improve.      Who to contact     If you have questions or need follow up information about today's clinic visit or your schedule please contact [facility] directly at [phone].  Normal or non-critical lab and imaging results will be communicated to you by CloudSponge[employer], letter or phone within 4 business days after the clinic has received the results. If you do not hear from us within 7 days, please contact the clinic through [employer] or phone. If you have a critical or abnormal lab result, we will notify you by phone as soon as possible.  Submit refill requests through [employer] or call your pharmacy and they will forward the refill request to us. Please allow 3 business days for your refill to be completed.          Additional Information About Your Visit        CloudSponge[employer] Information     [employer] gives you secure access to your electronic health record. If you see a primary care provider, you can also send messages to your care team and make appointments. If you have questions, please call your primary care clinic.  If you do not have a primary care provider, please call [phone] and they will assist you.        Care EveryWhere ID     This is your Care EveryWhere ID. This could be used by other organizations to access your [place] medical records  [id number]        Your Vitals Were     Pulse Temperature Respirations Height Pulse Oximetry BMI (Body Mass Index)    74 97.4  F (36.3  C) (Tympanic) 12 5' 3.25\" (1.607 m) 98% 26.01 kg/m2       Blood Pressure from Last 3 Encounters:   12/05/18 108/60   08/29/18 115/69   02/12/18 125/73 "    Weight from Last 3 Encounters:   12/05/18 148 lb (67.1 kg)   02/12/18 150 lb (68 kg)   12/05/17 148 lb (67.1 kg)              We Performed the Following     ORTHO  REFERRAL        Primary Care Provider Office Phone # Fax #    Ana Bello, ANGELITO SALAZAR [phone] [address] [address]        Equal Access to Services     GUSTAVO MATSON : Hadii aad ku hadasho Soomaali, waaxda luqadaha, qaybta kaalmada adeegyada, waxay idiin hayaan adeeg kharash la'júnior . So [facility] [phone].    ATENCIÓN: Si yeison vitale, tiene a bullock disposición servicios gratuitos de asistencia lingüística. Llame al [phone].    We comply with applicable federal civil rights laws and [place] laws. We do not discriminate on the basis of race, color, national origin, age, disability, sex, sexual orientation, or gender identity.            Thank you!     Thank you for choosing [facility]  for your care. Our goal is always to provide you with excellent care. Hearing back from our patients is one way we can continue to improve our services. Please take a few minutes to complete the written survey that you may receive in the mail after your visit with us. Thank you!             Your Updated Medication List - Protect others around you: Learn how to safely use, store and throw away your medicines at www.disposemymeds.org.          This list is accurate as of 12/5/18 11:39 AM.  Always use your most recent med list.                   Brand Name Dispense Instructions for use Diagnosis    ADVIL PM PO      Take  by mouth. As needed for sleep        ALAVERT PO      Take  by mouth as needed. For spring and fall allergies        aspirin 81 MG tablet    ASA    100    1 tab po QD (Once per day)    Pure hypercholesterolemia       BENADRYL ALLERGY PO      Take  by mouth as needed. For spring and fall allergies        calcium-vitamin D 500-125 MG-UNIT Tabs      Take 1 tablet by mouth 2 times  daily        multivitamin per tablet     100 Tab    take 1 Tab by mouth daily.        * order for DME     2 Units    Dynaflex insert    Bilateral foot pain, Hallux rigidus of both feet       * order for DME     1 Device    Equipment being ordered: Dynaflex inserts    Hallux rigidus of right foot       simvastatin 40 MG tablet    ZOCOR    90 tablet    Take 1 tablet (40 mg) by mouth At Bedtime at bedtime.    Pure hypercholesterolemia       SUDAFED PO      Take  by mouth as needed. For spring and fall allergies.        * Notice:  This list has 2 medication(s) that are the same as other medications prescribed for you. Read the directions carefully, and ask your doctor or other care provider to review them with you.

## 2018-12-05 NOTE — PROGRESS NOTES
"  SUBJECTIVE:   Felicitas Avalos is a 68 year old female who presents to clinic today for the following health issues:      Musculoskeletal problem/pain      Duration: 3 days    Description  Location: under right breast- lower rib area  Dull pain  She can reproduce the pain by pushing on her ribs    Intensity:  Moderate;  Worsening symptoms    Accompanying signs and symptoms: none    No bruising    No swelling    No redness    No shortness of breath or coughing    No leg pain    History  Previous similar problem: no   Previous evaluation:  None  No recent travel or long periods of inactivity    Precipitating or alleviating factors:  Trauma or overuse: no - was shoveling the day before the pain started.  Aggravating factors include: notices it more when lying down  Better when up and moving.    Therapies tried and outcome: NSAID - advil didn't help at all - only took it once          Problem list and histories reviewed & adjusted, as indicated.  Additional history: as documented      Reviewed and updated as needed this visit by clinical staff  Tobacco  Allergies  Meds       Reviewed and updated as needed this visit by Provider         ROS:  Constitutional, HEENT, cardiovascular, pulmonary, gi and gu systems are negative, except as otherwise noted.    OBJECTIVE:     /60 (BP Location: Right arm)  Pulse 74  Temp 97.4  F (36.3  C) (Tympanic)  Resp 12  Ht 5' 3.25\" (1.607 m)  Wt 148 lb (67.1 kg)  SpO2 98%  BMI 26.01 kg/m2  Body mass index is 26.01 kg/(m^2).  GENERAL: healthy, alert and no distress  RESP: lungs clear to auscultation - no rales, rhonchi or wheezes  CV: regular rate and rhythm, normal S1 S2, no S3 or S4, no murmur, click or rub, no peripheral edema and peripheral pulses strong  MS: right anterior chest - appearance normal. No tenderness to ribs on exam today.     Xray independently reviewed, negative. Radiologist read pending.      ASSESSMENT/PLAN:       ICD-10-CM    1. Rib pain R07.81 Likely " muscular due to shoveling.  Advised heat or ice.  NSAIDs for pain.  Follow up in one month if no improvement.  XR Ribs & Chest Right G/E 3 Views     2. Chronic right shoulder pain M25.511 ORTHO  REFERRAL - patient request; pain present for months. Has been to PT. Wondering about a cortisone shot.    G89.29        The risks, benefits and treatment options of prescribed medications or other treatments have been discussed with the patient. The patient verbalized their understanding and should call or follow up if no improvement or if they develop further problems.    ANGELITO Madden [facility]

## 2018-12-12 ENCOUNTER — OFFICE VISIT (OUTPATIENT)
Dept: ORTHOPEDICS | Facility: CLINIC | Age: 68
End: 2018-12-12
Payer: COMMERCIAL

## 2018-12-12 ENCOUNTER — ANCILLARY PROCEDURE (OUTPATIENT)
Dept: GENERAL RADIOLOGY | Facility: CLINIC | Age: 68
End: 2018-12-12
Attending: PEDIATRICS
Payer: COMMERCIAL

## 2018-12-12 VITALS
BODY MASS INDEX: 26.22 KG/M2 | SYSTOLIC BLOOD PRESSURE: 130 MMHG | WEIGHT: 148 LBS | DIASTOLIC BLOOD PRESSURE: 71 MMHG | HEIGHT: 63 IN

## 2018-12-12 DIAGNOSIS — G89.29 CHRONIC RIGHT SHOULDER PAIN: Primary | ICD-10-CM

## 2018-12-12 DIAGNOSIS — M25.511 CHRONIC RIGHT SHOULDER PAIN: Primary | ICD-10-CM

## 2018-12-12 DIAGNOSIS — M25.511 RIGHT SHOULDER PAIN: ICD-10-CM

## 2018-12-12 PROCEDURE — 99204 OFFICE O/P NEW MOD 45 MIN: CPT | Performed by: PEDIATRICS

## 2018-12-12 PROCEDURE — 73030 X-RAY EXAM OF SHOULDER: CPT | Mod: RT

## 2018-12-12 ASSESSMENT — MIFFLIN-ST. JEOR: SCORE: 1184.41

## 2018-12-12 NOTE — LETTER
12/12/2018         RE: Felicitas Avalos  [address] [address] [address]        Dear Colleague,    Thank you for referring your patient, Felicitas Avalos, to the [facility]. Please see a copy of my visit note below.    Sports Medicine Clinic Visit    PCP: Ana Bello    Felicitas Avalos is a 68 year old female who is seen  in consultation at the request of  Ana Bello C.N.P. presenting with right shoulder pain    Injury: She reports chronic right shoulder pain for 1 years. She reports no recent injury, but states her pain increased significantly this summer with golfing 3x per week. She states her shoulder pain is anterior. She reports her shoulder pain increases with golfing and carrying objects. She reports she typically has a consistent dull ache in her shoulder. She had complete physical therapy and continues to do a HEP. She is right hand dominate    Location of Pain: right shoulder  Duration of Pain: 1 year(s)  Rating of Pain at worst: 8/10  Rating of Pain Currently: 3/10  Symptoms are better with: Tylenol, Ibuprofen and Rest  Symptoms are worse with: lifting and golfing  Additional Features:   Positive: weakness   Negative: swelling, bruising, popping, grinding, catching, locking, instability, paresthesias and numbness  Other evaluation and/or treatments so far consists of: physical therapy  Prior History of related problems: nothing    Social History: retired, golf    Review of Systems  Skin: no bruising, no swelling  Musculoskeletal: as above  Neurologic: no numbness, paresthesias  Remainder of review of systems is negative including constitutional, CV, pulmonary, GI, except as noted in HPI or medical history.    Patient's current problem list, past medical and surgical history, and family history were reviewed.    Patient Active Problem List   Diagnosis     Pure hypercholesterolemia     Allergic rhinitis due to other allergen      "Bunion     Sleep related leg cramps     HYPERLIPIDEMIA LDL GOAL <130     ACP (advance care planning)     Tubular adenoma of colon     No past medical history on file.  Past Surgical History:   Procedure Laterality Date     COLONOSCOPY  2003     Normal     Family History   Problem Relation Age of Onset     C.A.D. Father         MI     Asthma Father      Hyperlipidemia Father      C.A.D. Maternal Grandmother         MI     Alcohol/Drug Maternal Grandfather      Alcohol/Drug Brother      C.A.D. Brother      Hypertension Brother      Lipids Brother      C.A.D. Brother      Anesthesia Reaction Brother      Hyperlipidemia Brother      C.A.D. Brother         Stent placed at age 51.     Lipids Brother      Heart Disease Brother         heart attack     Asthma Brother      Chemical Addiction Brother      Hyperlipidemia Brother      Cancer Brother 72        pancreatic     Other Cancer Brother         Pancreatic     Neurologic Disorder Mother         mild dementia     Heart Disease Mother         afib     Depression/Anxiety Daughter      Mental Illness Daughter         Eating disorder     Asthma Sister      Diabetes No family hx of      Coronary Artery Disease No family hx of      Breast Cancer No family hx of      Cancer - colorectal No family hx of      Ovarian Cancer No family hx of      Prostate Cancer No family hx of      Cerebrovascular Disease No family hx of      Thyroid Disease No family hx of      Osteoporosis No family hx of      Known Genetic Syndrome No family hx of          Objective  /71 (BP Location: Left arm, Patient Position: Chair, Cuff Size: Adult Regular)   Ht 1.607 m (5' 3.25\")   Wt 67.1 kg (148 lb)   BMI 26.01 kg/m       GENERAL APPEARANCE: healthy, alert and no distress   GAIT: NORMAL  SKIN: no suspicious lesions or rashes  HEENT: Sclera clear, anicteric  CV: good peripheral pulses  RESP: Breathing not labored  NEURO: Normal strength and tone, mentation intact and speech normal  PSYCH:  " mentation appears normal and affect normal/bright    Bilateral Shoulder exam    Inspection and Posture:       rounded shoulders and upper back    Skin:        no visible deformities    Tender:        none    Non Tender:       remainder of shoulder bilateral    ROM:        Full active and passive ROM with flexion, extension, abduction, internal and external rotation bilateral       asymmetric scapular motion    Painful motions:       end range flexion and elevation right    Strength:        abduction 5/5 bilateral       flexion 5/5 bilateral       internal rotation 5/5 bilateral       external rotation 5/5 bilateral    Impingement testing:       positive (+) Neer right       positive (+) Lopez right       positive (+) O'mihaela right    Sensation:        normal sensation over shoulder and upper extremity       Radiology  I ordered, visualized and reviewed these images with the patient  3 XR views of right shoulder reviewed: no acute bony abnormality, mild glenohumeral degenerative changes  - will follow official read      Assessment:  1. Chronic right shoulder pain      Chronic right shoulder pain, nonresponsive to physical therapy.  Does have significant dysfunction on exam however overall good strength.  Mild glenohumeral arthritis but good motion.  We discussed the following treatment options: symptom treatment, activity modification/rest, imaging, rehab and referral. Following discussion, plan: Given chronic symptoms will obtain MRI imaging.    Plan:  - Today's Plan of Care:  MRI of the right shoulder. Call [phone] to schedule MRI    -We also discussed other future treatment options:  Referral to orthopedic surgeon, Rehab: Physical Therapy or Steroid injection of shoulder    Follow Up: In clinic with Dr. Hernández after MRI (wait at least 1-2 days)    Concerning signs and symptoms were reviewed.  The patient expressed understanding of this management plan and all questions were answered at this time.    Rosalva  MD [facility]  Primary Care Sports Medicine  [facility]    Again, thank you for allowing me to participate in the care of your patient.        Sincerely,        Rosalva Hernández MD

## 2018-12-12 NOTE — PATIENT INSTRUCTIONS
Plan:  - Today's Plan of Care:  MRI of the right shoulder. Call [phone] to schedule MRI    -We also discussed other future treatment options:  Referral to orthopedic surgeon, Rehab: Physical Therapy or Steroid injection of shoulder    Follow Up: In clinic with Dr. Hernández after MRI (wait at least 1-2 days)    If you have any further questions for your physician or physician s care team you can call [phone] and use option 3 to leave a voice message. Calls received during business hours will be returned same day.

## 2018-12-12 NOTE — PROGRESS NOTES
Sports Medicine Clinic Visit    PCP: Ana Bello    Felicitas Avalos is a 68 year old female who is seen  in consultation at the request of  Ana Bello C.N.P. presenting with right shoulder pain    Injury: She reports chronic right shoulder pain for 1 years. She reports no recent injury, but states her pain increased significantly this summer with golfing 3x per week. She states her shoulder pain is anterior. She reports her shoulder pain increases with golfing and carrying objects. She reports she typically has a consistent dull ache in her shoulder. She had complete physical therapy and continues to do a HEP. She is right hand dominate    Location of Pain: right shoulder  Duration of Pain: 1 year(s)  Rating of Pain at worst: 8/10  Rating of Pain Currently: 3/10  Symptoms are better with: Tylenol, Ibuprofen and Rest  Symptoms are worse with: lifting and golfing  Additional Features:   Positive: weakness   Negative: swelling, bruising, popping, grinding, catching, locking, instability, paresthesias and numbness  Other evaluation and/or treatments so far consists of: physical therapy  Prior History of related problems: nothing    Social History: retired, golf    Review of Systems  Skin: no bruising, no swelling  Musculoskeletal: as above  Neurologic: no numbness, paresthesias  Remainder of review of systems is negative including constitutional, CV, pulmonary, GI, except as noted in HPI or medical history.    Patient's current problem list, past medical and surgical history, and family history were reviewed.    Patient Active Problem List   Diagnosis     Pure hypercholesterolemia     Allergic rhinitis due to other allergen     Bunion     Sleep related leg cramps     HYPERLIPIDEMIA LDL GOAL <130     ACP (advance care planning)     Tubular adenoma of colon     No past medical history on file.  Past Surgical History:   Procedure Laterality Date     COLONOSCOPY  2003     Normal     Family  "History   Problem Relation Age of Onset     C.A.D. Father         MI     Asthma Father      Hyperlipidemia Father      C.A.D. Maternal Grandmother         MI     Alcohol/Drug Maternal Grandfather      Alcohol/Drug Brother      C.A.D. Brother      Hypertension Brother      Lipids Brother      C.A.D. Brother      Anesthesia Reaction Brother      Hyperlipidemia Brother      C.A.D. Brother         Stent placed at age 51.     Lipids Brother      Heart Disease Brother         heart attack     Asthma Brother      Chemical Addiction Brother      Hyperlipidemia Brother      Cancer Brother 72        pancreatic     Other Cancer Brother         Pancreatic     Neurologic Disorder Mother         mild dementia     Heart Disease Mother         afib     Depression/Anxiety Daughter      Mental Illness Daughter         Eating disorder     Asthma Sister      Diabetes No family hx of      Coronary Artery Disease No family hx of      Breast Cancer No family hx of      Cancer - colorectal No family hx of      Ovarian Cancer No family hx of      Prostate Cancer No family hx of      Cerebrovascular Disease No family hx of      Thyroid Disease No family hx of      Osteoporosis No family hx of      Known Genetic Syndrome No family hx of          Objective  /71 (BP Location: Left arm, Patient Position: Chair, Cuff Size: Adult Regular)   Ht 1.607 m (5' 3.25\")   Wt 67.1 kg (148 lb)   BMI 26.01 kg/m      GENERAL APPEARANCE: healthy, alert and no distress   GAIT: NORMAL  SKIN: no suspicious lesions or rashes  HEENT: Sclera clear, anicteric  CV: good peripheral pulses  RESP: Breathing not labored  NEURO: Normal strength and tone, mentation intact and speech normal  PSYCH:  mentation appears normal and affect normal/bright    Bilateral Shoulder exam    Inspection and Posture:       rounded shoulders and upper back    Skin:        no visible deformities    Tender:        none    Non Tender:       remainder of shoulder bilateral    ROM:       "  Full active and passive ROM with flexion, extension, abduction, internal and external rotation bilateral       asymmetric scapular motion    Painful motions:       end range flexion and elevation right    Strength:        abduction 5/5 bilateral       flexion 5/5 bilateral       internal rotation 5/5 bilateral       external rotation 5/5 bilateral    Impingement testing:       positive (+) Neer right       positive (+) Lopez right       positive (+) O'mihaela right    Sensation:        normal sensation over shoulder and upper extremity       Radiology  I ordered, visualized and reviewed these images with the patient  3 XR views of right shoulder reviewed: no acute bony abnormality, mild glenohumeral degenerative changes  - will follow official read      Assessment:  1. Chronic right shoulder pain      Chronic right shoulder pain, nonresponsive to physical therapy.  Does have significant dysfunction on exam however overall good strength.  Mild glenohumeral arthritis but good motion.  We discussed the following treatment options: symptom treatment, activity modification/rest, imaging, rehab and referral. Following discussion, plan: Given chronic symptoms will obtain MRI imaging.    Plan:  - Today's Plan of Care:  MRI of the right shoulder. Call [phone] to schedule MRI    -We also discussed other future treatment options:  Referral to orthopedic surgeon, Rehab: Physical Therapy or Steroid injection of shoulder    Follow Up: In clinic with Dr. Hernández after MRI (wait at least 1-2 days)    Concerning signs and symptoms were reviewed.  The patient expressed understanding of this management plan and all questions were answered at this time.    Rosalva Hernández MD [place]  Primary Care Sports Medicine  [facility]

## 2018-12-19 ENCOUNTER — HOSPITAL ENCOUNTER (OUTPATIENT)
Dept: MRI IMAGING | Facility: CLINIC | Age: 68
Discharge: HOME | End: 2018-12-19
Attending: PEDIATRICS | Admitting: PEDIATRICS
Payer: MEDICARE

## 2018-12-19 DIAGNOSIS — G89.29 CHRONIC RIGHT SHOULDER PAIN: ICD-10-CM

## 2018-12-19 DIAGNOSIS — M25.511 CHRONIC RIGHT SHOULDER PAIN: ICD-10-CM

## 2018-12-19 PROCEDURE — 73221 MRI JOINT UPR EXTREM W/O DYE: CPT | Mod: RT

## 2018-12-21 ENCOUNTER — OFFICE VISIT (OUTPATIENT)
Dept: ORTHOPEDICS | Facility: CLINIC | Age: 68
End: 2018-12-21
Payer: COMMERCIAL

## 2018-12-21 VITALS
WEIGHT: 148 LBS | BODY MASS INDEX: 26.22 KG/M2 | HEIGHT: 63 IN | SYSTOLIC BLOOD PRESSURE: 116 MMHG | DIASTOLIC BLOOD PRESSURE: 62 MMHG

## 2018-12-21 DIAGNOSIS — M19.011 ARTHRITIS OF RIGHT ACROMIOCLAVICULAR JOINT: ICD-10-CM

## 2018-12-21 DIAGNOSIS — M19.011 GLENOHUMERAL ARTHRITIS, RIGHT: Primary | ICD-10-CM

## 2018-12-21 PROCEDURE — 99213 OFFICE O/P EST LOW 20 MIN: CPT | Mod: 25 | Performed by: PEDIATRICS

## 2018-12-21 PROCEDURE — 20610 DRAIN/INJ JOINT/BURSA W/O US: CPT | Mod: RT | Performed by: PEDIATRICS

## 2018-12-21 RX ORDER — TRIAMCINOLONE ACETONIDE 40 MG/ML
40 INJECTION, SUSPENSION INTRA-ARTICULAR; INTRAMUSCULAR
Status: DISCONTINUED | OUTPATIENT
Start: 2018-12-21 | End: 2019-12-31

## 2018-12-21 RX ORDER — LIDOCAINE HYDROCHLORIDE 10 MG/ML
2 INJECTION, SOLUTION INFILTRATION; PERINEURAL
Status: SHIPPED | OUTPATIENT
Start: 2018-12-21

## 2018-12-21 RX ADMIN — TRIAMCINOLONE ACETONIDE 40 MG: 40 INJECTION, SUSPENSION INTRA-ARTICULAR; INTRAMUSCULAR at 10:01

## 2018-12-21 RX ADMIN — LIDOCAINE HYDROCHLORIDE 2 ML: 10 INJECTION, SOLUTION INFILTRATION; PERINEURAL at 10:01

## 2018-12-21 ASSESSMENT — MIFFLIN-ST. JEOR: SCORE: 1184.41

## 2018-12-21 NOTE — PATIENT INSTRUCTIONS
Plan:  - Today's Plan of Care:  Rehab: Physical Therapy: [facility] - [phone]  Steroid injection of the right shoulder: subacromial space was performed today in clinic    -We also discussed other future treatment options:  Glenohumeral injection or surgical referral    Follow Up: as needed    If you have any further questions for your physician or physician s care team you can call [phone] and use option 3 to leave a voice message. Calls received during business hours will be returned same day.    After the Injection     After the injection, strenuous and repetitive activity should be minimized for approximately 48 hours.   Ice should be applied to the injected area at least for the next 48 hours.   Apply ice to the injected area at least 3 - 4 times a day for 20 minutes each time for the next 48 hours. This can reduce the painful  flare  reaction that can follow an injection the next day. This reaction can cause the area that was injected to hurt more the next day just from the injection. This will resolve within a day if it does occur.     Use over-the-counter pain medications such as Tylenol to help with the pain if necessary.     After 48 hours, icing the area may be continued if you find it beneficial.     The lidocaine or marcaine (commonly called Novocain) is an anesthetic agent that is injected with the steroid will typically relieve your pain for a few hours following the injection. If the  Novocain  and steroid are injected near a nerve, you may experience local numbness or weakness from the nerve block until it wears off. After this wears off your pain may return until the steroid takes effect.   The steroid may be effective immediately after the injection. Do not be concerned if the injection is not effective in relieving your symptoms immediately. In some cases, it may take up to two weeks for the steroid to work.   If you are diabetic, the corticosteroid may cause your blood sugar  to become elevated for several days following the injection. This response usually lasts about 2-4 days before it returns to your normal level.   You should report any adverse reaction to you doctor. Call if there are any questions.

## 2018-12-21 NOTE — PROGRESS NOTES
Sports Medicine Clinic Visit - Interim History December 21, 2018    PCP: Ana Bello    Felicitas Avalos is a 68 year old female who is seen in f/u up for right shoulder pain. Since last visit on 12/12/18, patient has been doing about the same and is here to review MRI results.    Initial Injury History 12/12/2018: She reports chronic right shoulder pain for 1 years. She reports no recent injury, but states her pain increased significantly this summer with golfing 3x per week. She states her shoulder pain is anterior. She reports her shoulder pain increases with golfing and carrying objects. She reports she typically has a consistent dull ache in her shoulder. She had complete physical therapy and continues to do a HEP. She is right hand dominate    Symptoms are better with: Tylenol, Ibuprofen and Rest  Symptoms are worse with: Lifting, golfing  Additional Features:   Positive: weakness   Negative: swelling, bruising, popping, grinding, catching, locking, instability, paresthesias, numbness, pain in other joints and systemic symptoms    Social History: retired, golf    Review of Systems  Skin: no bruising, no swelling  Musculoskeletal: as above  Neurologic: no numbness, paresthesias  Remainder of review of systems is negative including constitutional, CV, pulmonary, GI, except as noted in HPI or medical history.    Patient's current problem list, past medical and surgical history, and family history were reviewed.    Patient Active Problem List   Diagnosis     Pure hypercholesterolemia     Allergic rhinitis due to other allergen     Bunion     Sleep related leg cramps     HYPERLIPIDEMIA LDL GOAL <130     ACP (advance care planning)     Tubular adenoma of colon     No past medical history on file.  Past Surgical History:   Procedure Laterality Date     COLONOSCOPY  2003     Normal     Family History   Problem Relation Age of Onset     C.A.D. Father         MI     Asthma Father      Hyperlipidemia  "Father      C.A.D. Maternal Grandmother         MI     Alcohol/Drug Maternal Grandfather      Alcohol/Drug Brother      C.A.D. Brother      Hypertension Brother      Lipids Brother      C.A.D. Brother      Anesthesia Reaction Brother      Hyperlipidemia Brother      C.A.D. Brother         Stent placed at age 51.     Lipids Brother      Heart Disease Brother         heart attack     Asthma Brother      Chemical Addiction Brother      Hyperlipidemia Brother      Cancer Brother 72        pancreatic     Other Cancer Brother         Pancreatic     Neurologic Disorder Mother         mild dementia     Heart Disease Mother         afib     Depression/Anxiety Daughter      Mental Illness Daughter         Eating disorder     Asthma Sister      Diabetes No family hx of      Coronary Artery Disease No family hx of      Breast Cancer No family hx of      Cancer - colorectal No family hx of      Ovarian Cancer No family hx of      Prostate Cancer No family hx of      Cerebrovascular Disease No family hx of      Thyroid Disease No family hx of      Osteoporosis No family hx of      Known Genetic Syndrome No family hx of        Objective  /62 (BP Location: Left arm, Patient Position: Sitting, Cuff Size: Adult Regular)   Ht 1.607 m (5' 3.25\")   Wt 67.1 kg (148 lb)   BMI 26.01 kg/m      GENERAL APPEARANCE: healthy, alert and no distress   GAIT: NORMAL  SKIN: no suspicious lesions or rashes  HEENT: Sclera clear, anicteric  CV: good peripheral pulses  RESP: Breathing not labored  NEURO: Normal strength and tone, mentation intact and speech normal  PSYCH:  mentation appears normal and affect normal/bright    Bilateral Shoulder exam  Inspection and Posture:       rounded shoulders and upper back     Skin:        no visible deformities     Tender:        none     Non Tender:       remainder of shoulder bilateral     ROM:        Full active and passive ROM with flexion, extension, abduction, internal and external rotation " bilateral       asymmetric scapular motion     Painful motions:       end range flexion and elevation right     Strength:        abduction 5/5 bilateral       flexion 5/5 bilateral       internal rotation 5/5 bilateral       external rotation 5/5 bilateral     Impingement testing:       positive (+) Neer right       positive (+) Lopez right       positive (+) O'mihaela right     Sensation:        normal sensation over shoulder and upper extremity       Radiology  I ordered, visualized and reviewed these images with the patient  MR RIGHT SHOULDER WITHOUT CONTRAST  12/19/2018 11:34 AM      HISTORY:  Shoulder pain, rotator cuff tear/impingement suspected.  Evaluate rotator cuff tear. Chronic right shoulder pain.      TECHNIQUE:  Axial dual echo T2. Coronal T1. Coronal T2 with and  without fat suppression. Sagittal T2.     FINDINGS:  Osseous Acromion Outlet:  There is AC arthrosis, including  mild-moderate inferior hypertrophic change.  Moderate subacromial  spurring. No os acromiale.     Rotator Cuff: Supraspinatus tendon -  there is combined full-thickness  and partial-thickness tearing measuring 2.2 cm AP. The anterior  portion has full-thickness involvement. The posterior portion has  linear partial-thickness bursal involvement (sagittal images 9-10).  Moderate-prominent tendinosis. Infraspinatus tendon -  mild-moderate  tendinosis.  Subscapularis tendon -  no tear or significant  tendinosis.  Intact teres minor tendon.  There is mild-moderate teres  minor muscle atrophy. This is nonspecific and less than typically seen  in association with quadrilateral space syndrome or axillary nerve  injury.      Labral Structures: No definite tear identified. No labral cyst  identified.     Biceps Tendon: No tear, dislocation, or significant tendinosis.     Osseous and Cartilaginous Structures: Mild resorptive and bony  hypertrophic change of the greater tuberosity.  No bone contusion or  fracture. Changes of glenohumeral  osteoarthritis including  osteophytosis and some grade IV chondromalacia. There is reactive  subchondral change in the glenoid.     Joint Space: Synovitis and minimal joint effusion.     Additional Findings: No deltoid muscle edema. Joint fluid extends into  the subacromial-subdeltoid bursa. There is a 1.1 cm loose body within  the subacromial bursa at the superior aspect of the distal  supraspinatus muscle (this extended from the joint through the  supraspinatus tendon tear).                                                                      IMPRESSION:  1. 2.2 cm combined full-thickness and partial-thickness supraspinatus  tendon tear. Moderate-prominent tendinosis.  2. Anatomic findings which can predispose to impingement.  3. Mild-moderate infraspinatus tendinosis.  4. Mild-moderate teres minor muscle atrophy.  5. Changes of moderate glenohumeral osteoarthritis. Loose body within  the subacromial bursa.  6. Synovitis and minimal effusion.     DIANA MARINO MD    Assessment:  1. Glenohumeral arthritis, right    2. Tear of right supraspinatus tendon, initial encounter    3. Arthritis of right acromioclavicular joint      Chronic right shoulder pain with evidence of large supraspinatus tear along with arthritis, loose body and synovitis.  We reviewed images and discussed potential treatment options.  Discussed surgical referral, however, patient would not be interested in surgery at this time.  Discussed conservative care including continued physical therapy and possible injection -patient would like to proceed with injection today.    Plan:  - Today's Plan of Care:  Rehab: [facility] [phone]  Steroid injection of the right shoulder: subacromial space was performed today in clinic    -We also discussed other future treatment options:  Glenohumeral injection or surgical referral    Follow Up: as needed      Large Joint Injection/Arthocentesis: R subacromial bursa  Date/Time:  12/21/2018 10:01 AM  Performed by: Rosalva Hernández MD  Authorized by: Rosalva Hernández MD     Indications:  Pain  Needle Size:  25 G  Guidance: landmark guided    Approach:  Posterior  Location:  Shoulder  Site:  R subacromial bursa  Medications:  2 mL lidocaine 1 %; 40 mg triamcinolone 40 MG/ML  Outcome:  Tolerated well, no immediate complications  Consent Given by:  Patient  Prep: patient was prepped and draped in usual sterile fashion     The risks, benefits and complications of steroid injection were discussed with the patient (including but not limited to: bleeding, infection, pain, scar, damage to adjacent structures, atrophy or necrosis of soft tissue, skin blanching, failure to relieve symptoms, worsening of symptoms, allergic reaction). After this discussion all questions were addressed and answered and the patient elected to proceed. The patient tolerated the procedure well without complications.  Also discussed that if diabetic, recommend close monitoring of blood sugars over the next week as cortisone injections can temporarily elevate blood sugars.      Concerning signs and symptoms were reviewed.  The patient expressed understanding of this management plan and all questions were answered at this time.    Rosalva Hernández MD CAQ  Primary Care Sports Medicine  [facility]

## 2018-12-21 NOTE — LETTER
12/21/2018         RE: Felicitas Avalos  [address] [id number]        Dear Colleague,    Thank you for referring your patient, Felicitas Avalos, to the [facility]. Please see a copy of my visit note below.    Sports Medicine Clinic Visit - Interim History December 21, 2018    PCP: Ana Bello    Felicitas Avalos is a 68 year old female who is seen in f/u up for right shoulder pain. Since last visit on 12/12/18, patient has been doing about the same and is here to review MRI results.    Initial Injury History 12/12/2018: She reports chronic right shoulder pain for 1 years. She reports no recent injury, but states her pain increased significantly this summer with golfing 3x per week. She states her shoulder pain is anterior. She reports her shoulder pain increases with golfing and carrying objects. She reports she typically has a consistent dull ache in her shoulder. She had complete physical therapy and continues to do a HEP. She is right hand dominate    Symptoms are better with: Tylenol, Ibuprofen and Rest  Symptoms are worse with: Lifting, golfing  Additional Features:   Positive: weakness   Negative: swelling, bruising, popping, grinding, catching, locking, instability, paresthesias, numbness, pain in other joints and systemic symptoms    Social History: retired, golf    Review of Systems  Skin: no bruising, no swelling  Musculoskeletal: as above  Neurologic: no numbness, paresthesias  Remainder of review of systems is negative including constitutional, CV, pulmonary, GI, except as noted in HPI or medical history.    Patient's current problem list, past medical and surgical history, and family history were reviewed.    Patient Active Problem List   Diagnosis     Pure hypercholesterolemia     Allergic rhinitis due to other allergen     Bunion     Sleep related leg cramps     HYPERLIPIDEMIA LDL GOAL <130     ACP (advance care planning)     Tubular  "adenoma of colon     No past medical history on file.  Past Surgical History:   Procedure Laterality Date     COLONOSCOPY  2003     Normal     Family History   Problem Relation Age of Onset     C.A.D. Father         MI     Asthma Father      Hyperlipidemia Father      C.A.D. Maternal Grandmother         MI     Alcohol/Drug Maternal Grandfather      Alcohol/Drug Brother      C.A.D. Brother      Hypertension Brother      Lipids Brother      C.A.D. Brother      Anesthesia Reaction Brother      Hyperlipidemia Brother      C.A.D. Brother         Stent placed at age 51.     Lipids Brother      Heart Disease Brother         heart attack     Asthma Brother      Chemical Addiction Brother      Hyperlipidemia Brother      Cancer Brother 72        pancreatic     Other Cancer Brother         Pancreatic     Neurologic Disorder Mother         mild dementia     Heart Disease Mother         afib     Depression/Anxiety Daughter      Mental Illness Daughter         Eating disorder     Asthma Sister      Diabetes No family hx of      Coronary Artery Disease No family hx of      Breast Cancer No family hx of      Cancer - colorectal No family hx of      Ovarian Cancer No family hx of      Prostate Cancer No family hx of      Cerebrovascular Disease No family hx of      Thyroid Disease No family hx of      Osteoporosis No family hx of      Known Genetic Syndrome No family hx of        Objective  /62 (BP Location: Left arm, Patient Position: Sitting, Cuff Size: Adult Regular)   Ht 1.607 m (5' 3.25\")   Wt 67.1 kg (148 lb)   BMI 26.01 kg/m       GENERAL APPEARANCE: healthy, alert and no distress   GAIT: NORMAL  SKIN: no suspicious lesions or rashes  HEENT: Sclera clear, anicteric  CV: good peripheral pulses  RESP: Breathing not labored  NEURO: Normal strength and tone, mentation intact and speech normal  PSYCH:  mentation appears normal and affect normal/bright    Bilateral Shoulder exam  Inspection and Posture:       rounded " shoulders and upper back     Skin:        no visible deformities     Tender:        none     Non Tender:       remainder of shoulder bilateral     ROM:        Full active and passive ROM with flexion, extension, abduction, internal and external rotation bilateral       asymmetric scapular motion     Painful motions:       end range flexion and elevation right     Strength:        abduction 5/5 bilateral       flexion 5/5 bilateral       internal rotation 5/5 bilateral       external rotation 5/5 bilateral     Impingement testing:       positive (+) Neer right       positive (+) Lopez right       positive (+) O'mihaela right     Sensation:        normal sensation over shoulder and upper extremity       Radiology  I ordered, visualized and reviewed these images with the patient  MR RIGHT SHOULDER WITHOUT CONTRAST  12/19/2018 11:34 AM      HISTORY:  Shoulder pain, rotator cuff tear/impingement suspected.  Evaluate rotator cuff tear. Chronic right shoulder pain.      TECHNIQUE:  Axial dual echo T2. Coronal T1. Coronal T2 with and  without fat suppression. Sagittal T2.     FINDINGS:  Osseous Acromion Outlet:  There is AC arthrosis, including  mild-moderate inferior hypertrophic change.  Moderate subacromial  spurring. No os acromiale.     Rotator Cuff: Supraspinatus tendon -  there is combined full-thickness  and partial-thickness tearing measuring 2.2 cm AP. The anterior  portion has full-thickness involvement. The posterior portion has  linear partial-thickness bursal involvement (sagittal images 9-10).  Moderate-prominent tendinosis. Infraspinatus tendon -  mild-moderate  tendinosis.  Subscapularis tendon -  no tear or significant  tendinosis.  Intact teres minor tendon.  There is mild-moderate teres  minor muscle atrophy. This is nonspecific and less than typically seen  in association with quadrilateral space syndrome or axillary nerve  injury.      Labral Structures: No definite tear identified. No labral  cyst  identified.     Biceps Tendon: No tear, dislocation, or significant tendinosis.     Osseous and Cartilaginous Structures: Mild resorptive and bony  hypertrophic change of the greater tuberosity.  No bone contusion or  fracture. Changes of glenohumeral osteoarthritis including  osteophytosis and some grade IV chondromalacia. There is reactive  subchondral change in the glenoid.     Joint Space: Synovitis and minimal joint effusion.     Additional Findings: No deltoid muscle edema. Joint fluid extends into  the subacromial-subdeltoid bursa. There is a 1.1 cm loose body within  the subacromial bursa at the superior aspect of the distal  supraspinatus muscle (this extended from the joint through the  supraspinatus tendon tear).                                                                      IMPRESSION:  1. 2.2 cm combined full-thickness and partial-thickness supraspinatus  tendon tear. Moderate-prominent tendinosis.  2. Anatomic findings which can predispose to impingement.  3. Mild-moderate infraspinatus tendinosis.  4. Mild-moderate teres minor muscle atrophy.  5. Changes of moderate glenohumeral osteoarthritis. Loose body within  the subacromial bursa.  6. Synovitis and minimal effusion.     DIANA MARINO MD    Assessment:  1. Glenohumeral arthritis, right    2. Tear of right supraspinatus tendon, initial encounter    3. Arthritis of right acromioclavicular joint      Chronic right shoulder pain with evidence of large supraspinatus tear along with arthritis, loose body and synovitis.  We reviewed images and discussed potential treatment options.  Discussed surgical referral, however, patient would not be interested in surgery at this time.  Discussed conservative care including continued physical therapy and possible injection -patient would like to proceed with injection today.    Plan:  - Today's Plan of Care:  Rehab[facility] [phone]  Steroid injection of the right  shoulder: subacromial space was performed today in clinic    -We also discussed other future treatment options:  Glenohumeral injection or surgical referral    Follow Up: as needed      Large Joint Injection/Arthocentesis: R subacromial bursa  Date/Time: 12/21/2018 10:01 AM  Performed by: Rosalva Hernández MD  Authorized by: Rosalva Hernández MD     Indications:  Pain  Needle Size:  25 G  Guidance: landmark guided    Approach:  Posterior  Location:  Shoulder  Site:  R subacromial bursa  Medications:  2 mL lidocaine 1 %; 40 mg triamcinolone 40 MG/ML  Outcome:  Tolerated well, no immediate complications  Consent Given by:  Patient  Prep: patient was prepped and draped in usual sterile fashion     The risks, benefits and complications of steroid injection were discussed with the patient (including but not limited to: bleeding, infection, pain, scar, damage to adjacent structures, atrophy or necrosis of soft tissue, skin blanching, failure to relieve symptoms, worsening of symptoms, allergic reaction). After this discussion all questions were addressed and answered and the patient elected to proceed. The patient tolerated the procedure well without complications.  Also discussed that if diabetic, recommend close monitoring of blood sugars over the next week as cortisone injections can temporarily elevate blood sugars.      Concerning signs and symptoms were reviewed.  The patient expressed understanding of this management plan and all questions were answered at this time.    Rosalva Hernández MD [facility]  Primary Care Sports Medicine  [facility]    Again, thank you for allowing me to participate in the care of your patient.        Sincerely,        Rosalva Hernández MD

## 2019-01-08 ENCOUNTER — HOSPITAL ENCOUNTER (OUTPATIENT)
Dept: PHYSICAL THERAPY | Facility: CLINIC | Age: 69
Setting detail: THERAPIES SERIES
End: 2019-01-08
Attending: PEDIATRICS
Payer: COMMERCIAL

## 2019-01-08 DIAGNOSIS — M19.011 GLENOHUMERAL ARTHRITIS, RIGHT: ICD-10-CM

## 2019-01-08 DIAGNOSIS — M19.011 ARTHRITIS OF RIGHT ACROMIOCLAVICULAR JOINT: ICD-10-CM

## 2019-01-08 PROCEDURE — 97110 THERAPEUTIC EXERCISES: CPT | Mod: GP | Performed by: PHYSICAL THERAPIST

## 2019-01-08 PROCEDURE — 97161 PT EVAL LOW COMPLEX 20 MIN: CPT | Mod: GP | Performed by: PHYSICAL THERAPIST

## 2019-01-08 PROCEDURE — G8984 CARRY CURRENT STATUS: HCPCS | Mod: GP,CI | Performed by: PHYSICAL THERAPIST

## 2019-01-08 PROCEDURE — G8985 CARRY GOAL STATUS: HCPCS | Mod: GP,CH | Performed by: PHYSICAL THERAPIST

## 2019-01-08 NOTE — PROGRESS NOTES
[facility]          OUTPATIENT PHYSICAL THERAPY ORTHOPEDIC EVALUATION  PLAN OF TREATMENT FOR OUTPATIENT REHABILITATION  (COMPLETE FOR INITIAL CLAIMS ONLY)  Patient's Last Name, First Name, M.I.  YOB: 1950  Raissa Avalosa  CHIDI    Provider s Name:  [facility]   Medical Record No.  [id number]   Start of Care Date:  01/08/19   Onset Date:  07/01/17   Type:     _X__PT   ___OT   ___SLP Medical Diagnosis:        PT Diagnosis:  R shoulder pain, weakness   Visits from SOC:  1      _________________________________________________________________________________  Plan of Treatment/Functional Goals:  strengthening, ROM, stretching, neuromuscular re-education, joint mobilization, manual therapy  to address impairments above, improve overall functional capacity        Goals  Goal Identifier: 1  Goal Description: Pt will be able to wash back with R arm without difficulty  Target Date: 02/19/19    Goal Identifier: 2  Goal Description: Pt will be able to place objects on high shelf without difficulty  Target Date: 02/19/19    Goal Identifier: 3  Goal Description: Pt will demonstrate full 5/5 rotator cuff strength in order to improve performance with ADLs  Target Date: 02/19/19    Goal Identifier: 4  Goal Description: Pt will be independent with updated HEP  Target Date: 02/19/19    Therapy Frequency:  1 time/week  Predicted Duration of Therapy Intervention:  6 weeks    Deric Garcias PT                 I CERTIFY THE NEED FOR THESE SERVICES FURNISHED UNDER        THIS PLAN OF TREATMENT AND WHILE UNDER MY CARE     (Physician co-signature of this document indicates review and certification of the therapy plan).                       Certification Date From:    1/8/2019  Certification Date To:   2/19/2019    Referring Provider:  Dr. Hernández    Initial Assessment        See Epic Evaluation Start of Care Date: 01/08/19                                                                                 Felicitas Avalos   OP PT Initial Evaluation  01/08/19 1400   General Information   Type of Visit Initial OP Ortho PT Evaluation   Start of Care Date 01/08/19   Referring Physician Dr. Hernández   Patient/Family Goals Statement get stronger, be able to do everything   Orders Evaluate and Treat   Insurance Type Other   Insurance Comments/Visits Authorized [employer] G-codes required, AUTH prior to 21st visit   Medical Diagnosis Glenohumeral artirits R, Tear of right Supraspinatus tendon, Arthritis of R AC joint   Surgical/Medical history reviewed Yes   Precautions/Limitations no known precautions/limitations   Body Part(s)   Body Part(s) Shoulder   Presentation and Etiology   Pertinent history of current problem (include personal factors and/or comorbidities that impact the POC) Pt states that 2 summers ago she retired and was playing more golf and her shoulder started to bother her. At the time she had an X-ray and it was thought to be tendonitis. She tried PT for a while, but ultimately wasn't changing much. She had a cortisone injection and saw Dr. Hernández and had another X-Ray/MRI which showed the tear. Pt reports that she is currently pain free since the shot. She notices some weakness, but is able to do most things. She denies any vague numbness, tingling, or other symptoms.   Impairments A. Pain;D. Decreased ROM;F. Decreased strength and endurance   Functional Limitations perform desired leisure / sports activities;perform activities of daily living   Symptom Location R shoulder   How/Where did it occur With repetition/overuse;During recreation/sports   Onset date of current episode/exacerbation 07/01/17   Chronicity Chronic   Pain rating (0-10 point scale) Best (/10);Worst (/10)   Best (/10) 0   Worst (/10) 0   Pain quality B. Dull   Frequency of pain/symptoms D. Other   Pain frequency comment gone since cortisone injection   Pain/symptoms exacerbated by C. Lifting;D. Carrying;K. Home tasks;M.  Other  (golf, washing the car, using arm)   Pain/symptoms eased by C. Rest;H. Cold;I. OTC medication(s);K. Other   Pain eased by comment cortisone injection   Progression of symptoms since onset: Improved   Prior Level of Function   Prior Level of Function-Mobility Independent, physically active, golf   Current Level of Function   Patient role/employment history F. Retired   Fall Risk Screen   Fall screen completed by PT   Have you fallen 2 or more times in the past year? No   Have you fallen and had an injury in the past year? No   Is patient a fall risk? No   System Outcome Measures   Outcome Measures (SPADI-26%)   Shoulder Objective Findings   Side (if bilateral, select both right and left) Right   Integumentary  no redness, swelling or bruising noted.    Posture normal   Cervical Screen (ROM, quadrant) WFL   Thoracic Mobility Screen WFL   Scapulothoracic Rhythm good scap set   Pec Minor (supine) Flexibility slightly rounded in supine   Neer's Test -   Lopez-Aidan Test +R   Shoulder Special Tests Comments external rotation lag sign, hornblower's, full can, empty can, and lateral liat all -    Palpation no tenderness noted along R shoulder complex   Accessory Motion/Joint Mobility normal GH, ACJ, and SCJ motion    Right Shoulder Flexion AROM full 180 no pain   Right Shoulder Flexion PROM full 180 no pain, slight crepitus   Right Shoulder Abduction AROM full 180 no pain   Right Shoulder Abduction PROM full 180 slight pain, slight crepitus   Right Shoulder ER AROM WFL   Right Shoulder ER PROM at 0/45/90 abduction as follows 90/90/90 normal end feel all no pain   Right Shoulder IR AROM symmetrical behind back to left arm   Right Shoulder IR PROM IR90 65 slight tightness   Right Shoulder Flexion Strength 4+/5   Right Shoulder Abduction Strength 4+/5   Right Shoulder ER Strength 4+/5   Right Shoulder IR Strength 5/5   Planned Therapy Interventions   Planned Therapy Interventions  strengthening;ROM;stretching;neuromuscular re-education;joint mobilization;manual therapy   Planned Therapy Interventions Comment to address impairments above, improve overall functional capacity   Clinical Impression   Criteria for Skilled Therapeutic Interventions Met yes, treatment indicated   PT Diagnosis R shoulder pain, weakness   Influenced by the following impairments weakness   Functional limitations due to impairments functional weakness   Clinical Presentation Stable/Uncomplicated   Clinical Presentation Rationale PT judgement, subjective report, objective data   Clinical Decision Making (Complexity) Low complexity   Therapy Frequency 1 time/week   Predicted Duration of Therapy Intervention (days/wks) 6 weeks   Risk & Benefits of therapy have been explained Yes   Patient, Family & other staff in agreement with plan of care Yes   Clinical Impression Comments Pt is a pleasant 69 y/o female presenting to PT with R shoulder weaknees. She will benefit from skilled PT to address problems list above and improve overall function. Pt is a good PT candidate.    Education Assessment   Preferred Learning Style Listening;Demonstration;Pictures/video   Barriers to Learning No barriers   ORTHO GOALS   PT Ortho Eval Goals 1;2;3;4   Ortho Goal 1   Goal Identifier 1   Goal Description Pt will be able to wash back with R arm without difficulty   Target Date 02/19/19   Ortho Goal 2   Goal Identifier 2   Goal Description Pt will be able to place objects on high shelf without difficulty   Target Date 02/19/19   Ortho Goal 3   Goal Identifier 3   Goal Description Pt will demonstrate full 5/5 rotator cuff strength in order to improve performance with ADLs   Target Date 02/19/19   Ortho Goal 4   Goal Identifier 4   Goal Description Pt will be independent with updated HEP   Target Date 02/19/19   Total Evaluation Time   PT Eval, Low Complexity Minutes (34304) 15       Please Contact me with any questions or concerns. Thank you for  for patience and cooperation.     Deric Garcias PT, DPT  Flexible Workforce Physical Therapist   [facility]  [email or web address]

## 2019-01-21 ENCOUNTER — HOSPITAL ENCOUNTER (OUTPATIENT)
Dept: PHYSICAL THERAPY | Facility: CLINIC | Age: 69
Setting detail: THERAPIES SERIES
End: 2019-01-21
Attending: PEDIATRICS
Payer: COMMERCIAL

## 2019-01-21 PROCEDURE — 97110 THERAPEUTIC EXERCISES: CPT | Mod: GP | Performed by: PHYSICAL THERAPIST

## 2019-01-31 ENCOUNTER — HOSPITAL ENCOUNTER (OUTPATIENT)
Dept: PHYSICAL THERAPY | Facility: CLINIC | Age: 69
Setting detail: THERAPIES SERIES
End: 2019-01-31
Attending: PEDIATRICS
Payer: COMMERCIAL

## 2019-01-31 PROCEDURE — G8985 CARRY GOAL STATUS: HCPCS | Mod: GP,CI | Performed by: PHYSICAL THERAPIST

## 2019-01-31 PROCEDURE — G8986 CARRY D/C STATUS: HCPCS | Mod: GP,CI | Performed by: PHYSICAL THERAPIST

## 2019-01-31 PROCEDURE — 97110 THERAPEUTIC EXERCISES: CPT | Mod: GP | Performed by: PHYSICAL THERAPIST

## 2019-01-31 NOTE — PROGRESS NOTES
Outpatient Physical Therapy Discharge Note     Patient: Felicitas Avalos  : 1950    Beginning/End Dates of Reporting Period:  2019 to 2019    Referring Provider: Dr. Hernández    Therapy Diagnosis: R shoulder pain     Client Self Report: Pt reports that the shoulder is feeling really good overall. She only reports difficulty after really hard days does she notice it.     Objective Measurements:  Objective Measure: ROM  Details: full AROM and PROM of shoulder without pain or tightness. Posterior capsule full now.  Objective Measure: MMT R shoulder  Details: ER 5/5, IR 5/5, flexion 5/5, abduction 5/5, shrug 5/5  Objective Measure: Assessory motion   Details: full GH motion all planes no pain    Goals:  Goal Identifier 1   Goal Description Pt will be able to wash back with R arm without difficulty   Target Date 19   Date Met  19   Progress:     Goal Identifier 2   Goal Description Pt will be able to place objects on high shelf without difficulty   Target Date 19   Date Met  19   Progress:     Goal Identifier 3   Goal Description Pt will demonstrate full 5/5 rotator cuff strength in order to improve performance with ADLs   Target Date 19   Date Met  19   Progress:     Goal Identifier 4   Goal Description Pt will be independent with updated HEP   Target Date 19   Date Met  19(pt is performing exercises regularly)   Progress:       Progress Toward Goals:   Progress this reporting period: goals met, see above    Plan:  Discharge from therapy.    Discharge:    Reason for Discharge: Patient has met all goals.  No further expectation of progress.    Equipment Issued: none    Discharge Plan: Patient to continue home program.     19 1300   Signing Clinician's Name / Credentials   Signing clinician's name / credentials Deric Garcias, PT, DPT   Session Number   Session Number 3/ planned are Medicare (G-codes, AUTH before  visit)   Progress  Note/Recertification   Progress Note Due Date 02/08/19   Progress Note Completed Date 01/31/19   Recertification Due Date 02/19/19   PT G-Codes   G-code Carrying, moving and handling objects   Carrying, Moving and Handling Objects Goal Status () CI   Carrying, Moving and Handling Objects Discharge Status () CI   Discharge Status Carry, Moving and Handling Modifier Rationale PT judgement, subjective report, objective data   Adult Goals   PT Ortho Eval Goals 1;2;3;4   Ortho Goal 1   Goal Identifier 1   Goal Description Pt will be able to wash back with R arm without difficulty   Target Date 02/19/19   Date Met 01/31/19   Ortho Goal 2   Goal Identifier 2   Goal Description Pt will be able to place objects on high shelf without difficulty   Target Date 02/19/19   Date Met 01/31/19   Ortho Goal 3   Goal Identifier 3   Goal Description Pt will demonstrate full 5/5 rotator cuff strength in order to improve performance with ADLs   Target Date 02/19/19   Date Met 01/31/19   Ortho Goal 4   Goal Identifier 4   Goal Description Pt will be independent with updated HEP   Target Date 02/19/19   Date Met 01/31/19  (pt is performing exercises regularly)   Subjective Report   Subjective Report Pt reports that the shoulder is feeling really good overall. She only reports difficulty after really hard days does she notice it.    Objective Measures   Objective Measures Objective Measure 1;Objective Measure 2;Objective Measure 3;Objective Measure 4   Objective Measure 1   Objective Measure ROM   Details full AROM and PROM of shoulder without pain or tightness. Posterior capsule full now.   Objective Measure 2   Objective Measure MMT R shoulder   Details ER 5/5, IR 5/5, flexion 5/5, abduction 5/5, shrug 5/5   Objective Measure 3   Objective Measure Assessory motion    Details full GH motion all planes no pain   System Outcome Measures   Outcome Measures (SPADI-26% on Eval)   Treatment Interventions   Interventions Therapeutic  Procedure/Exercise;Manual Therapy   Therapeutic Procedure/exercise   Therapeutic Procedures: strength, endurance, ROM, flexibillity minutes (68610) 30   Skilled Intervention HEP instruction, stretching, strengthening, education   Patient Response no pain, good form, increased strength   Treatment Detail Added Y's in prone x 20B with BW, prone rows with 3# x 30R, standing scaption to 90 degrees with 3#B, T's with 1# x 20, Banded ER with GTB x 20R with towel under arm, banded IR with GTB x 30R, scap sets with black TB with 5 sec holds x 20, cross body posterior capsule stretch 2 x 30 sec R, ROM, MMT, assessory motion reassessed   Assessments Completed   Assessments Completed Patient has made good progress throughout PT and has met all goals. At this time she will be discharged with an updated HEP. See will continue to benefit from performing the exercises for maintenence.   Education   Learner Patient   Readiness Eager;Acceptance   Method Explanation;Booklet/handout;Demonstration   Response Verbalizes Understanding;Demonstrates Understanding   Plan   Homework HEP above   Home program see above   Plan for next session discharge with updated HEP   Total Session Time   Timed Code Treatment Minutes 30   Total Treatment Time (sum of timed and untimed services) 30, te2       Please Contact me with any questions or concerns. Thank you for for patience and cooperation.     Deric Garcias PT, DPT  Flexible Workforce Physical Therapist   [facility]  [email or web address]

## 2019-02-08 DIAGNOSIS — E78.00 PURE HYPERCHOLESTEROLEMIA: ICD-10-CM

## 2019-02-08 RX ORDER — SIMVASTATIN 40 MG
TABLET ORAL
Qty: 30 TABLET | Refills: 0 | Status: SHIPPED | OUTPATIENT
Start: 2019-02-08 | End: 2019-02-14

## 2019-02-08 NOTE — LETTER
[facility]  [address] [address]  [phone]        February 8, 2019    Felicitas Avalos                                                                                                                     [address] [address] [address]              Dear Felicitas,    We have received a refill request from your pharmacy for Simvastatin, however, we were only able to provide a one time fill because you are due for fasting labs and and Office visit .     Please call [phone]  to schedule an appointment before you are due for your next refill.              Sincerely,         Ana Bello's Care Team

## 2019-02-11 ENCOUNTER — HOSPITAL ENCOUNTER (OUTPATIENT)
Dept: MAMMOGRAPHY | Facility: CLINIC | Age: 69
Discharge: HOME | End: 2019-02-11
Attending: NURSE PRACTITIONER | Admitting: NURSE PRACTITIONER
Payer: COMMERCIAL

## 2019-02-11 DIAGNOSIS — Z12.31 VISIT FOR SCREENING MAMMOGRAM: ICD-10-CM

## 2019-02-11 PROCEDURE — 77067 SCR MAMMO BI INCL CAD: CPT

## 2019-02-14 ENCOUNTER — OFFICE VISIT (OUTPATIENT)
Dept: FAMILY MEDICINE | Facility: CLINIC | Age: 69
End: 2019-02-14
Payer: COMMERCIAL

## 2019-02-14 VITALS
OXYGEN SATURATION: 98 % | WEIGHT: 144 LBS | BODY MASS INDEX: 25.52 KG/M2 | DIASTOLIC BLOOD PRESSURE: 70 MMHG | HEART RATE: 67 BPM | HEIGHT: 63 IN | TEMPERATURE: 97.8 F | SYSTOLIC BLOOD PRESSURE: 112 MMHG

## 2019-02-14 DIAGNOSIS — Z00.00 ROUTINE GENERAL MEDICAL EXAMINATION AT A HEALTH CARE FACILITY: Primary | ICD-10-CM

## 2019-02-14 DIAGNOSIS — E78.00 PURE HYPERCHOLESTEROLEMIA: ICD-10-CM

## 2019-02-14 LAB
CHOLEST SERPL-MCNC: 220 MG/DL
GLUCOSE SERPL-MCNC: 85 MG/DL (ref 70–99)
HDLC SERPL-MCNC: 72 MG/DL
LDLC SERPL CALC-MCNC: 135 MG/DL
NONHDLC SERPL-MCNC: 148 MG/DL
TRIGL SERPL-MCNC: 63 MG/DL

## 2019-02-14 PROCEDURE — 36415 COLL VENOUS BLD VENIPUNCTURE: CPT | Performed by: NURSE PRACTITIONER

## 2019-02-14 PROCEDURE — 82947 ASSAY GLUCOSE BLOOD QUANT: CPT | Performed by: NURSE PRACTITIONER

## 2019-02-14 PROCEDURE — 80061 LIPID PANEL: CPT | Performed by: NURSE PRACTITIONER

## 2019-02-14 PROCEDURE — G0438 PPPS, INITIAL VISIT: HCPCS | Performed by: NURSE PRACTITIONER

## 2019-02-14 RX ORDER — SIMVASTATIN 40 MG
TABLET ORAL
Qty: 90 TABLET | Refills: 3 | Status: SHIPPED | OUTPATIENT
Start: 2019-02-14 | End: 2019-12-31

## 2019-02-14 ASSESSMENT — MIFFLIN-ST. JEOR: SCORE: 1166.27

## 2019-02-14 NOTE — PATIENT INSTRUCTIONS
Preventive Health Recommendations    See your health care provider every year to    Review health changes.     Discuss preventive care.      Review your medicines if your doctor has prescribed any.      You no longer need a yearly Pap test unless you've had an abnormal Pap test in the past 10 years. If you have vaginal symptoms, such as bleeding or discharge, be sure to talk with your provider about a Pap test.      Every 1 to 2 years, have a mammogram.  If you are over 69, talk with your health care provider about whether or not you want to continue having screening mammograms.      Every 10 years, have a colonoscopy. Or, have a yearly FIT test (stool test). These exams will check for colon cancer.       Have a cholesterol test every 5 years, or more often if your doctor advises it.       Have a diabetes test (fasting glucose) every three years. If you are at risk for diabetes, you should have this test more often.       At age 65, have a bone density scan (DEXA) to check for osteoporosis (brittle bone disease).    Shots:    Get a flu shot each year.    Get a tetanus shot every 10 years.    Talk to your doctor about your pneumonia vaccines. There are now two you should receive - Pneumovax (PPSV 23) and Prevnar (PCV 13).    Talk to your pharmacist about the shingles vaccine.    Talk to your doctor about the hepatitis B vaccine.    Nutrition:     Eat at least 5 servings of fruits and vegetables each day.      Eat whole-grain bread, whole-wheat pasta and brown rice instead of white grains and rice.      Get adequate about Calcium and Vitamin D.     Lifestyle    Exercise at least 150 minutes a week (30 minutes a day, 5 days a week). This will help you control your weight and prevent disease.      Limit alcohol to one drink per day.      No smoking.       Wear sunscreen to prevent skin cancer.       See your dentist twice a year for an exam and cleaning.      See your eye doctor every 1 to 2 years to screen for  conditions such as glaucoma, macular degeneration, cataracts, etc.    Personalized Prevention Plan  You are due for the preventive services outlined below.  Your care team is available to assist you in scheduling these services.  If you have already completed any of these items, please share that information with your care team to update in your medical record.    Health Maintenance Due   Topic Date Due     Zoster (Shingles) Vaccine (2 of 3) 03/29/2016     Pap Smear - every 3 years  01/29/2019     FALL RISK ASSESSMENT  02/12/2019     Depression Assessment 2 - yearly  02/12/2019

## 2019-02-14 NOTE — PROGRESS NOTES
"  SUBJECTIVE:   Felicitas Avalos is a 68 year old female who presents for Preventive Visit.    Are you in the first 12 months of your Medicare Part B coverage?  No    Physical Health:    In general, how would you rate your overall physical health? excellent    Outside of work, how many days during the week do you exercise? 6-7 days/week    Outside of work, approximately how many minutes a day do you exercise?greater than 60 minutes    If you drink alcohol do you typically have >3 drinks per day or >7 drinks per week? No    Do you usually eat at least 4 servings of fruit and vegetables a day, include whole grains & fiber and avoid regularly eating high fat or \"junk\" foods? Yes    Do you have any problems taking medications regularly?  No    Do you have any side effects from medications? none    Needs assistance for the following daily activities: no assistance needed    Which of the following safety concerns are present in your home?  lack of grab bars in the bathroom     Hearing impairment: No    In the past 6 months, have you been bothered by leaking of urine? no    Mental Health:    In general, how would you rate your overall mental or emotional health? excellent  PHQ-2 Score:      Do you feel safe in your environment? Yes    Do you have a Health Care Directive? Yes: Advance Directive has been received and scanned.    Additional concerns to address?  No    Fall risk:  Fallen 2 or more times in the past year?: No  Any fall with injury in the past year?: No    Cognitive Screenin) Repeat 3 items (Leader, Season, Table)    2) Clock draw: NORMAL  3) 3 item recall: Recalls 2 objects   Results: NORMAL clock, 1-2 items recalled: COGNITIVE IMPAIRMENT LESS LIKELY    Mini-CogTM Copyright GABRIEL Rivera. Licensed by the author for use in [facility]; reprinted with permission (jet@.[facility]). All rights reserved.      Do you have sleep apnea, excessive snoring or daytime drowsiness?: no        Hyperlipidemia " "Follow-Up      Rate your low fat/cholesterol diet?: good    Taking statin?  Yes, no muscle aches from statin    Other lipid medications/supplements?:  none      Reviewed and updated as needed this visit by clinical staff  Tobacco  Allergies  Meds  Med Hx  Surg Hx  Fam Hx  Soc Hx        Reviewed and updated as needed this visit by Provider        Social History     Tobacco Use     Smoking status: Never Smoker     Smokeless tobacco: Never Used   Substance Use Topics     Alcohol use: Yes     Comment: On rare occasions                           Current providers sharing in care for this patient include:   Patient Care Team:  Ana Bello APRN CNP as PCP - General (Nurse Practitioner)  Ana Bello APRN CNP as PCP - Assigned PCP  Ana Bello APRN CNP as Referring Physician (Nurse Practitioner)  Aliza Pace PA as Physician Assistant (Physician Assistant)    The following health maintenance items are reviewed in Epic and correct as of today:  Health Maintenance   Topic Date Due     ZOSTER IMMUNIZATION (2 of 3) 03/29/2016     PAP Q3 YR  01/29/2019     FALL RISK ASSESSMENT  02/12/2019     PHQ-2 Q1 YR  02/12/2019     MAMMO SCREEN Q2 YR (SYSTEM ASSIGNED)  02/11/2021     COLONOSCOPY Q5 YR  01/30/2022     ADVANCE DIRECTIVE PLANNING Q5 YRS  05/07/2022     LIPID SCREEN Q5 YR FEMALE (SYSTEM ASSIGNED)  02/12/2023     DTAP/TDAP/TD IMMUNIZATION (4 - Td) 01/29/2026     DEXA SCAN SCREENING (SYSTEM ASSIGNED)  Completed     INFLUENZA VACCINE  Completed     HEPATITIS C SCREENING  Completed     IPV IMMUNIZATION  Aged Out     MENINGITIS IMMUNIZATION  Aged Out           ROS:  Constitutional, HEENT, cardiovascular, pulmonary, GI, , musculoskeletal, neuro, skin, endocrine and psych systems are negative, except as otherwise noted.    OBJECTIVE:   /70 (BP Location: Right arm)   Pulse 67   Temp 97.8  F (36.6  C) (Tympanic)   Ht 1.607 m (5' 3.25\")   Wt 65.3 kg (144 lb)   " "SpO2 98%   Breastfeeding? No   BMI 25.31 kg/m   Estimated body mass index is 25.31 kg/m  as calculated from the following:    Height as of this encounter: 1.607 m (5' 3.25\").    Weight as of this encounter: 65.3 kg (144 lb).  EXAM:   GENERAL APPEARANCE: healthy, alert and no distress  EYES: Eyes grossly normal to inspection, PERRL and conjunctivae and sclerae normal  HENT: ear canals and TM's normal, nose and mouth without ulcers or lesions, oropharynx clear and oral mucous membranes moist  NECK: no adenopathy, no asymmetry, masses, or scars and thyroid normal to palpation  RESP: lungs clear to auscultation - no rales, rhonchi or wheezes  BREAST: normal without masses, tenderness or nipple discharge and no palpable axillary masses or adenopathy  CV: regular rate and rhythm, normal S1 S2, no S3 or S4, no murmur, click or rub, no peripheral edema and peripheral pulses strong  ABDOMEN: soft, nontender, no hepatosplenomegaly, no masses and bowel sounds normal   (female): normal female external genitalia and normal urethral meatus  MS: no musculoskeletal defects are noted and gait is age appropriate without ataxia  SKIN: no suspicious lesions or rashes  NEURO: Normal strength and tone, sensory exam grossly normal, mentation intact and speech normal  PSYCH: mentation appears normal and affect normal/bright        ASSESSMENT / PLAN:       ICD-10-CM    1. Routine general medical examination at a health care facility Z00.00 GLUCOSE     Lipid panel reflex to direct LDL Fasting   2. PURE HYPERCHOLESTEROLEM E78.00 simvastatin (ZOCOR) 40 MG tablet       End of Life Planning:  Patient currently has an advanced directive: Yes.  Practitioner is supportive of decision.    COUNSELING:  Reviewed preventive health counseling, as reflected in patient instructions    BP Readings from Last 1 Encounters:   02/14/19 112/70     Estimated body mass index is 25.31 kg/m  as calculated from the following:    Height as of this encounter: " "1.607 m (5' 3.25\").    Weight as of this encounter: 65.3 kg (144 lb).           reports that  has never smoked. she has never used smokeless tobacco.      Appropriate preventive services were discussed with this patient, including applicable screening as appropriate for cardiovascular disease, diabetes, osteopenia/osteoporosis, and glaucoma.  As appropriate for age/gender, discussed screening for colorectal cancer, prostate cancer, breast cancer, and cervical cancer. Checklist reviewing preventive services available has been given to the patient.    Reviewed patients plan of care and provided an AVS. The Basic Care Plan (routine screening as documented in Health Maintenance) for Felicitas meets the Care Plan requirement. This Care Plan has been established and reviewed with the Patient.    The risks, benefits and treatment options of prescribed medications or other treatments have been discussed with the patient. The patient verbalized their understanding and should call or follow up if no improvement or if they develop further problems.    ANGELITO Madden [facility]  "

## 2019-03-20 ENCOUNTER — OFFICE VISIT (OUTPATIENT)
Dept: ORTHOPEDICS | Facility: CLINIC | Age: 69
End: 2019-03-20
Payer: COMMERCIAL

## 2019-03-20 VITALS
WEIGHT: 144 LBS | SYSTOLIC BLOOD PRESSURE: 122 MMHG | DIASTOLIC BLOOD PRESSURE: 66 MMHG | BODY MASS INDEX: 25.52 KG/M2 | HEIGHT: 63 IN

## 2019-03-20 DIAGNOSIS — M19.011 GLENOHUMERAL ARTHRITIS, RIGHT: Primary | ICD-10-CM

## 2019-03-20 DIAGNOSIS — M19.011 ARTHRITIS OF RIGHT ACROMIOCLAVICULAR JOINT: ICD-10-CM

## 2019-03-20 DIAGNOSIS — M25.511 CHRONIC RIGHT SHOULDER PAIN: ICD-10-CM

## 2019-03-20 DIAGNOSIS — G89.29 CHRONIC RIGHT SHOULDER PAIN: ICD-10-CM

## 2019-03-20 PROCEDURE — 99213 OFFICE O/P EST LOW 20 MIN: CPT | Performed by: PEDIATRICS

## 2019-03-20 ASSESSMENT — MIFFLIN-ST. JEOR: SCORE: 1166.27

## 2019-03-20 NOTE — PATIENT INSTRUCTIONS
Plan:  - Today's Plan of Care:  Referral for US guided steroid injection of the Glenohumeral joint of the right shoulder    -We also discussed other future treatment options:  Referral to orthopedic surgeon    Follow Up: as needed    If you have any further questions for your physician or physician s care team you can call [phone] and use option 3 to leave a voice message. Calls received during business hours will be returned same day.

## 2019-03-20 NOTE — PROGRESS NOTES
Sports Medicine Clinic Visit - Interim History March 20, 2019    PCP: Ana Bello    Felicitas Avalos is a 68 year old female who is seen in f/u up for    Glenohumeral arthritis, right  Tear of right supraspinatus tendon, initial encounter  Arthritis of right acromioclavicular joint  Chronic right shoulder pain.  Since last visit on 12/21/18, patient has had a subacromial steroid injection of the right shoulder and has been doing physical therapy. She reports the injection reduced her pain for 6 -8 weeks. She reports physical therapy has increased her pain. She reports new crepitis has developed in her shoulder joint and she is having lateral shoulder pain. She has rested from physical exercise and her pain has decreased.    Initial Injury History 12/12/2018: She reports chronic right shoulder pain for 1 years. She reports no recent injury, but states her pain increased significantly this summer with golfing 3x per week. She states her shoulder pain is anterior. She reports her shoulder pain increases with golfing and carrying objects. She reports she typically has a consistent dull ache in her shoulder. She had complete physical therapy and continues to do a HEP. She is right hand dominate    Symptoms are better with: Tylenol, Ibuprofen and Rest  Symptoms are worse with: lifting and golf  Additional Features:   Positive: weakness, popping, and grinding   Negative: swelling, bruising, catching, locking, instability, paresthesias and numbness    Social History: retired, golf    Review of Systems  Skin: no bruising, no swelling  Musculoskeletal: as above  Neurologic: no numbness, paresthesias  Remainder of review of systems is negative including constitutional, CV, pulmonary, GI, except as noted in HPI or medical history.    Patient's current problem list, past medical and surgical history, and family history were reviewed.    Patient Active Problem List   Diagnosis     Pure hypercholesterolemia      "Allergic rhinitis due to other allergen     Bunion     Sleep related leg cramps     HYPERLIPIDEMIA LDL GOAL <130     ACP (advance care planning)     Tubular adenoma of colon     No past medical history on file.  Past Surgical History:   Procedure Laterality Date     COLONOSCOPY  2003     Normal     Family History   Problem Relation Age of Onset     C.A.D. Father         MI     Asthma Father      Hyperlipidemia Father      Heart Disease Father      C.A.D. Maternal Grandmother         MI     Alcohol/Drug Maternal Grandfather      Alcohol/Drug Brother      C.A.D. Brother      Hypertension Brother      Lipids Brother      C.A.D. Brother      Anesthesia Reaction Brother      Hyperlipidemia Brother      Coronary Artery Disease Brother      C.A.D. Brother         Stent placed at age 51.     Lipids Brother      Heart Disease Brother         heart attack     Asthma Brother      Chemical Addiction Brother      Hyperlipidemia Brother      Cancer Brother 72        pancreatic     Other Cancer Brother         Pancreatic     Neurologic Disorder Mother         mild dementia     Heart Disease Mother         afib     Depression/Anxiety Daughter      Mental Illness Daughter         Eating disorder     Asthma Sister      Coronary Artery Disease Sister      Diabetes No family hx of      Breast Cancer No family hx of      Cancer - colorectal No family hx of      Ovarian Cancer No family hx of      Prostate Cancer No family hx of      Cerebrovascular Disease No family hx of      Thyroid Disease No family hx of      Osteoporosis No family hx of      Known Genetic Syndrome No family hx of        Objective  /66 (BP Location: Left arm, Patient Position: Chair, Cuff Size: Adult Regular)   Ht 1.607 m (5' 3.25\")   Wt 65.3 kg (144 lb)   BMI 25.31 kg/m      GENERAL APPEARANCE: healthy, alert and no distress   GAIT: NORMAL  SKIN: no suspicious lesions or rashes  HEENT: Sclera clear, anicteric  CV: good peripheral pulses  RESP: Breathing " not labored  NEURO: Normal strength and tone, mentation intact and speech normal  PSYCH:  mentation appears normal and affect normal/bright    Bilateral Shoulder exam  Inspection and Posture:       rounded shoulders and upper back     Skin:        no visible deformities     Tender:        none     Non Tender:       remainder of shoulder bilateral     ROM:        Full active and passive ROM with flexion, extension, abduction, internal and external rotation bilateral       asymmetric scapular motion     Painful motions:       end range flexion and elevation right     Strength:        abduction 5/5 bilateral       flexion 5/5 bilateral       internal rotation 5/5 bilateral       external rotation 5/5 bilateral     Impingement testing:       positive (+) Neer right       positive (+) Lopez right       positive (+) O'mihaela right     Sensation:        normal sensation over shoulder and upper extremity     Radiology  I visualized and reviewed these images with the patient  MR RIGHT SHOULDER WITHOUT CONTRAST  12/19/2018 11:34 AM   HISTORY:  Shoulder pain, rotator cuff tear/impingement suspected.  Evaluate rotator cuff tear. Chronic right shoulder pain.   TECHNIQUE:  Axial dual echo T2. Coronal T1. Coronal T2 with and  without fat suppression. Sagittal T2.  FINDINGS:  Osseous Acromion Outlet:  There is AC arthrosis, including  mild-moderate inferior hypertrophic change.  Moderate subacromial  spurring. No os acromiale.  Rotator Cuff: Supraspinatus tendon -  there is combined full-thickness  and partial-thickness tearing measuring 2.2 cm AP. The anterior  portion has full-thickness involvement. The posterior portion has  linear partial-thickness bursal involvement (sagittal images 9-10).  Moderate-prominent tendinosis. Infraspinatus tendon -  mild-moderate  tendinosis.  Subscapularis tendon -  no tear or significant  tendinosis.  Intact teres minor tendon.  There is mild-moderate teres  minor muscle atrophy. This is  nonspecific and less than typically seen  in association with quadrilateral space syndrome or axillary nerve  injury.   Labral Structures: No definite tear identified. No labral cyst  identified.  Biceps Tendon: No tear, dislocation, or significant tendinosis.  Osseous and Cartilaginous Structures: Mild resorptive and bony  hypertrophic change of the greater tuberosity.  No bone contusion or  fracture. Changes of glenohumeral osteoarthritis including  osteophytosis and some grade IV chondromalacia. There is reactive  subchondral change in the glenoid.  Joint Space: Synovitis and minimal joint effusion.  Additional Findings: No deltoid muscle edema. Joint fluid extends into  the subacromial-subdeltoid bursa. There is a 1.1 cm loose body within  the subacromial bursa at the superior aspect of the distal  supraspinatus muscle (this extended from the joint through the  supraspinatus tendon tear).  IMPRESSION:  1. 2.2 cm combined full-thickness and partial-thickness supraspinatus  tendon tear. Moderate-prominent tendinosis.  2. Anatomic findings which can predispose to impingement.  3. Mild-moderate infraspinatus tendinosis.  4. Mild-moderate teres minor muscle atrophy.  5. Changes of moderate glenohumeral osteoarthritis. Loose body within  the subacromial bursa.  6. Synovitis and minimal effusion.    Assessment:  1. Glenohumeral arthritis, right    2. Tear of right supraspinatus tendon, initial encounter    3. Arthritis of right acromioclavicular joint    4. Chronic right shoulder pain      Chronic right shoulder pain with evidence of large supraspinatus tear along with arthritis, loose body and synovitis.  We reviewed images and discussed potential treatment options.  Discussed surgical referral, however, patient would not be interested in surgery at this time and overall has good motion and strength.  Discussed conservative care including trial of GH injection next step.  Can discontinue PT exercises if they are  painful.    Plan:  - Today's Plan of Care:  Referral for US guided steroid injection of the Glenohumeral joint of the right shoulder    -We also discussed other future treatment options:  Referral to orthopedic surgeon    Follow Up: as needed    Concerning signs and symptoms were reviewed.  The patient expressed understanding of this management plan and all questions were answered at this time.    Rosalva Hernández MD CAQ  Primary Care Sports Medicine  [facility]

## 2019-03-20 NOTE — LETTER
3/20/2019         RE: Felicitas Avalos  [address] [address] [address]        Dear Colleague,    Thank you for referring your patient, Felicitas Avalos, to the [facility]. Please see a copy of my visit note below.    Sports Medicine Clinic Visit - Interim History March 20, 2019    PCP: Ana Bello    Felicitas Avalos is a 68 year old female who is seen in f/u up for    Glenohumeral arthritis, right  Tear of right supraspinatus tendon, initial encounter  Arthritis of right acromioclavicular joint  Chronic right shoulder pain.  Since last visit on 12/21/18, patient has had a subacromial steroid injection of the right shoulder and has been doing physical therapy. She reports the injection reduced her pain for 6 -8 weeks. She reports physical therapy has increased her pain. She reports new crepitis has developed in her shoulder joint and she is having lateral shoulder pain. She has rested from physical exercise and her pain has decreased.    Initial Injury History 12/12/2018: She reports chronic right shoulder pain for 1 years. She reports no recent injury, but states her pain increased significantly this summer with golfing 3x per week. She states her shoulder pain is anterior. She reports her shoulder pain increases with golfing and carrying objects. She reports she typically has a consistent dull ache in her shoulder. She had complete physical therapy and continues to do a HEP. She is right hand dominate    Symptoms are better with: Tylenol, Ibuprofen and Rest  Symptoms are worse with: lifting and golf  Additional Features:   Positive: weakness, popping, and grinding   Negative: swelling, bruising, catching, locking, instability, paresthesias and numbness    Social History: retired, golf    Review of Systems  Skin: no bruising, no swelling  Musculoskeletal: as above  Neurologic: no numbness, paresthesias  Remainder of review of systems is negative including  constitutional, CV, pulmonary, GI, except as noted in HPI or medical history.    Patient's current problem list, past medical and surgical history, and family history were reviewed.    Patient Active Problem List   Diagnosis     Pure hypercholesterolemia     Allergic rhinitis due to other allergen     Bunion     Sleep related leg cramps     HYPERLIPIDEMIA LDL GOAL <130     ACP (advance care planning)     Tubular adenoma of colon     No past medical history on file.  Past Surgical History:   Procedure Laterality Date     COLONOSCOPY  2003     Normal     Family History   Problem Relation Age of Onset     C.A.D. Father         MI     Asthma Father      Hyperlipidemia Father      Heart Disease Father      C.A.D. Maternal Grandmother         MI     Alcohol/Drug Maternal Grandfather      Alcohol/Drug Brother      C.A.D. Brother      Hypertension Brother      Lipids Brother      C.A.D. Brother      Anesthesia Reaction Brother      Hyperlipidemia Brother      Coronary Artery Disease Brother      C.A.D. Brother         Stent placed at age 51.     Lipids Brother      Heart Disease Brother         heart attack     Asthma Brother      Chemical Addiction Brother      Hyperlipidemia Brother      Cancer Brother 72        pancreatic     Other Cancer Brother         Pancreatic     Neurologic Disorder Mother         mild dementia     Heart Disease Mother         afib     Depression/Anxiety Daughter      Mental Illness Daughter         Eating disorder     Asthma Sister      Coronary Artery Disease Sister      Diabetes No family hx of      Breast Cancer No family hx of      Cancer - colorectal No family hx of      Ovarian Cancer No family hx of      Prostate Cancer No family hx of      Cerebrovascular Disease No family hx of      Thyroid Disease No family hx of      Osteoporosis No family hx of      Known Genetic Syndrome No family hx of        Objective  /66 (BP Location: Left arm, Patient Position: Chair, Cuff Size: Adult  "Regular)   Ht 1.607 m (5' 3.25\")   Wt 65.3 kg (144 lb)   BMI 25.31 kg/m       GENERAL APPEARANCE: healthy, alert and no distress   GAIT: NORMAL  SKIN: no suspicious lesions or rashes  HEENT: Sclera clear, anicteric  CV: good peripheral pulses  RESP: Breathing not labored  NEURO: Normal strength and tone, mentation intact and speech normal  PSYCH:  mentation appears normal and affect normal/bright    Bilateral Shoulder exam  Inspection and Posture:       rounded shoulders and upper back     Skin:        no visible deformities     Tender:        none     Non Tender:       remainder of shoulder bilateral     ROM:        Full active and passive ROM with flexion, extension, abduction, internal and external rotation bilateral       asymmetric scapular motion     Painful motions:       end range flexion and elevation right     Strength:        abduction 5/5 bilateral       flexion 5/5 bilateral       internal rotation 5/5 bilateral       external rotation 5/5 bilateral     Impingement testing:       positive (+) Neer right       positive (+) Lopez right       positive (+) O'mihaela right     Sensation:        normal sensation over shoulder and upper extremity     Radiology  I visualized and reviewed these images with the patient  MR RIGHT SHOULDER WITHOUT CONTRAST  12/19/2018 11:34 AM   HISTORY:  Shoulder pain, rotator cuff tear/impingement suspected.  Evaluate rotator cuff tear. Chronic right shoulder pain.   TECHNIQUE:  Axial dual echo T2. Coronal T1. Coronal T2 with and  without fat suppression. Sagittal T2.  FINDINGS:  Osseous Acromion Outlet:  There is AC arthrosis, including  mild-moderate inferior hypertrophic change.  Moderate subacromial  spurring. No os acromiale.  Rotator Cuff: Supraspinatus tendon -  there is combined full-thickness  and partial-thickness tearing measuring 2.2 cm AP. The anterior  portion has full-thickness involvement. The posterior portion has  linear partial-thickness bursal involvement " (sagittal images 9-10).  Moderate-prominent tendinosis. Infraspinatus tendon -  mild-moderate  tendinosis.  Subscapularis tendon -  no tear or significant  tendinosis.  Intact teres minor tendon.  There is mild-moderate teres  minor muscle atrophy. This is nonspecific and less than typically seen  in association with quadrilateral space syndrome or axillary nerve  injury.   Labral Structures: No definite tear identified. No labral cyst  identified.  Biceps Tendon: No tear, dislocation, or significant tendinosis.  Osseous and Cartilaginous Structures: Mild resorptive and bony  hypertrophic change of the greater tuberosity.  No bone contusion or  fracture. Changes of glenohumeral osteoarthritis including  osteophytosis and some grade IV chondromalacia. There is reactive  subchondral change in the glenoid.  Joint Space: Synovitis and minimal joint effusion.  Additional Findings: No deltoid muscle edema. Joint fluid extends into  the subacromial-subdeltoid bursa. There is a 1.1 cm loose body within  the subacromial bursa at the superior aspect of the distal  supraspinatus muscle (this extended from the joint through the  supraspinatus tendon tear).  IMPRESSION:  1. 2.2 cm combined full-thickness and partial-thickness supraspinatus  tendon tear. Moderate-prominent tendinosis.  2. Anatomic findings which can predispose to impingement.  3. Mild-moderate infraspinatus tendinosis.  4. Mild-moderate teres minor muscle atrophy.  5. Changes of moderate glenohumeral osteoarthritis. Loose body within  the subacromial bursa.  6. Synovitis and minimal effusion.    Assessment:  1. Glenohumeral arthritis, right    2. Tear of right supraspinatus tendon, initial encounter    3. Arthritis of right acromioclavicular joint    4. Chronic right shoulder pain      Chronic right shoulder pain with evidence of large supraspinatus tear along with arthritis, loose body and synovitis.  We reviewed images and discussed potential treatment options.   Discussed surgical referral, however, patient would not be interested in surgery at this time and overall has good motion and strength.  Discussed conservative care including trial of GH injection next step.  Can discontinue PT exercises if they are painful.    Plan:  - Today's Plan of Care:  Referral for US guided steroid injection of the Glenohumeral joint of the right shoulder    -We also discussed other future treatment options:  Referral to orthopedic surgeon    Follow Up: as needed    Concerning signs and symptoms were reviewed.  The patient expressed understanding of this management plan and all questions were answered at this time.    Rosalva Hernández MD CAQ  Primary Care Sports Medicine  [facility]    Again, thank you for allowing me to participate in the care of your patient.        Sincerely,        Rosalva Hernández MD

## 2019-04-01 ENCOUNTER — OFFICE VISIT (OUTPATIENT)
Dept: ORTHOPEDICS | Facility: CLINIC | Age: 69
End: 2019-04-01
Payer: COMMERCIAL

## 2019-04-01 VITALS — BODY MASS INDEX: 25.31 KG/M2 | HEIGHT: 63 IN

## 2019-04-01 DIAGNOSIS — M19.011 ARTHRITIS OF RIGHT SHOULDER REGION: Primary | ICD-10-CM

## 2019-04-01 PROCEDURE — 20611 DRAIN/INJ JOINT/BURSA W/US: CPT | Performed by: FAMILY MEDICINE

## 2019-04-01 RX ORDER — ROPIVACAINE HYDROCHLORIDE 5 MG/ML
3 INJECTION, SOLUTION EPIDURAL; INFILTRATION; PERINEURAL
Status: SHIPPED | OUTPATIENT
Start: 2019-04-01

## 2019-04-01 RX ORDER — BETAMETHASONE SODIUM PHOSPHATE AND BETAMETHASONE ACETATE 3; 3 MG/ML; MG/ML
6 INJECTION, SUSPENSION INTRA-ARTICULAR; INTRALESIONAL; INTRAMUSCULAR; SOFT TISSUE
Status: SHIPPED | OUTPATIENT
Start: 2019-04-01

## 2019-04-01 RX ADMIN — BETAMETHASONE SODIUM PHOSPHATE AND BETAMETHASONE ACETATE 6 MG: 3; 3 INJECTION, SUSPENSION INTRA-ARTICULAR; INTRALESIONAL; INTRAMUSCULAR; SOFT TISSUE at 08:15

## 2019-04-01 RX ADMIN — ROPIVACAINE HYDROCHLORIDE 3 ML: 5 INJECTION, SOLUTION EPIDURAL; INFILTRATION; PERINEURAL at 08:15

## 2019-04-01 NOTE — PROGRESS NOTES
Large Joint Injection/Arthocentesis: R glenohumeral joint  Date/Time: 4/1/2019 8:15 AM  Performed by: Geoffrey Morales MD  Authorized by: Geoffrey Morales MD     Indications:  Pain and osteoarthritis  Needle Size:  25 G  Guidance: ultrasound    Approach:  Posterolateral  Location:  Shoulder  Site:  R glenohumeral joint  Medications:  6 mg betamethasone acet & sod phos 6 (3-3) MG/ML; 3 mL ropivacaine 5 MG/ML  Medications comment:  Actual amount of ropivacaine used 4 mL  Outcome:  Tolerated well, no immediate complications  Procedure discussed: discussed risks, benefits, and alternatives    Consent Given by:  Patient  Timeout: timeout called immediately prior to procedure    Prep: patient was prepped and draped in usual sterile fashion     Patient reported significant improvement of pain after injection.  Aftercare instructions given to patient.  Plan to follow-up as previously discussed with referring provider.     Geoffrey Morales MD [facility]

## 2019-04-01 NOTE — PATIENT INSTRUCTIONS
[facility] Injection Discharge Instructions      You may shower, however avoid swimming, tub baths or hot tubs for 24 hours following your procedure    You may have a mild to moderate increase in pain for several days following the injection.    It may take up to 14 days for the steroid medication to start working although you may feel the effect as early as a few days after the procedure.    You may use ice packs for 10-15 minutes, 3 to 4 times a day at the injection site for comfort    You may use anti-inflammatory medications (such as Ibuprofen or Aleve or Advil) or Tylenol for pain control if necessary    If you were fasting, you may resume your normal diet and medications after the procedure    If you have diabetes, check your blood sugar more frequently than usual as your blood sugar may be higher than normal for 10-14 days following a steroid injection. Contact your doctor who manages your diabetes if your blood sugar is higher than usual      If you experience any of the following, call [facility] @ [phone] or [phone]  -Fever over 100 degree F  -Swelling, bleeding, redness, drainage, warmth at the injection site  - New or worsening pain

## 2019-04-01 NOTE — LETTER
4/1/2019         RE: Felicitas Avalos  [address] [address] [address]        Dear Colleague,    Thank you for referring your patient, Felicitas Avalos, to the [facility]. Please see a copy of my visit note below.    Large Joint Injection/Arthocentesis: R glenohumeral joint  Date/Time: 4/1/2019 8:15 AM  Performed by: Geoffrey Morales MD  Authorized by: Geoffrey Morales MD     Indications:  Pain and osteoarthritis  Needle Size:  25 G  Guidance: ultrasound    Approach:  Posterolateral  Location:  Shoulder  Site:  R glenohumeral joint  Medications:  6 mg betamethasone acet & sod phos 6 (3-3) MG/ML; 3 mL ropivacaine 5 MG/ML  Medications comment:  Actual amount of ropivacaine used 4 mL  Outcome:  Tolerated well, no immediate complications  Procedure discussed: discussed risks, benefits, and alternatives    Consent Given by:  Patient  Timeout: timeout called immediately prior to procedure    Prep: patient was prepped and draped in usual sterile fashion     Patient reported significant improvement of pain after injection.  Aftercare instructions given to patient.  Plan to follow-up as previously discussed with referring provider.     Geoffrey Morales MD [facility]              Again, thank you for allowing me to participate in the care of your patient.        Sincerely,        Geoffrey Morales MD

## 2019-07-10 ENCOUNTER — THERAPY VISIT (OUTPATIENT)
Dept: PHYSICAL THERAPY | Facility: CLINIC | Age: 69
End: 2019-07-10
Payer: COMMERCIAL

## 2019-07-10 DIAGNOSIS — M25.511 ACUTE PAIN OF RIGHT SHOULDER: Primary | ICD-10-CM

## 2019-07-10 PROCEDURE — 97161 PT EVAL LOW COMPLEX 20 MIN: CPT | Mod: GP | Performed by: PHYSICAL THERAPIST

## 2019-07-10 PROCEDURE — 97110 THERAPEUTIC EXERCISES: CPT | Mod: GP | Performed by: PHYSICAL THERAPIST

## 2019-07-10 NOTE — PROGRESS NOTES
[facility] Initial Evaluation  Subjective:  The history is provided by the patient. No  was used.   Felicitas Avalos being seen for Right shoulder.   Where condition occurred: for unknown reasons.  and reported as 4/10 on pain scale. General health as reported by patient is good. Pertinent medical history includes:  Menopausal, pain at night/rest and other. Other medical history details: High cholesterol.    Surgeries include:  None.  Current medications:  Other. Other medications details: Simvastatin for reducing high cholesterol.   Primary job tasks include:  Repetitive tasks.  Pain is described as aching and is constant. Pain is worse during the night. Since onset symptoms are gradually worsening. Special tests:  MRI and x-ray. Previous treatment includes physical therapy and other. There was none improvement following previous treatment.   Patient is Retired.   Barriers include:  None as reported by patient.  Red flags:  Pain at rest/night.                      Objective:  Standing Alignment:    Cervical/Thoracic:  Forward head  Shoulder/UE:  Rounded shoulders              Gait:    Gait Type:  Normal         Flexibility/Screens:   Negative screens: Cervical           Neurological: She is alert. She has normal reflexes. No cranial nerve deficit or sensory deficit.                      Shoulder Evaluation:  ROM:  AROM:                          Extension/Internal Rotation:  Left:  T4    Right:  T7    PROM:    Flexion:  Left:  160    Right: 160          Internal Rotation:  Left:  90    Right:  90  External Rotation:  Left:  90    Right:  80                    Strength:  : Weak and painful external rotation, internal rotation, abduction and abduction.  Scapular dyskinesis also recognized.                        Special Tests:      Right shoulder positive for the following special tests:Impingement and Acrimioclavicular                                       General      ROS    Assessment/Plan:    Patient is a 69 year old female with right side shoulder complaints.    Patient has the following significant findings with corresponding treatment plan.                Diagnosis 1: Right shoulder internal glenohumeral arthritis, and right supraspinatus tendon tear.  Pain -  hot/cold therapy, self management, education, directional preference exercise and home program  Decreased ROM/flexibility - manual therapy and therapeutic exercise  Decreased strength - therapeutic exercise and therapeutic activities    Therapy Evaluation Codes:   1) History comprised of:   Personal factors that impact the plan of care:      Age and Time since onset of symptoms.    Comorbidity factors that impact the plan of care are:      None.     Medications impacting care: Anti-inflammatory.  2) Examination of Body Systems comprised of:   Body structures and functions that impact the plan of care:      Shoulder.   Activity limitations that impact the plan of care are:      Bathing, Driving, Dressing, Lifting, Sleeping and Laying down.  3) Clinical presentation characteristics are:   Stable/Uncomplicated.  4) Decision-Making    Low complexity using standardized patient assessment instrument and/or measureable assessment of functional outcome.  Cumulative Therapy Evaluation is: Low complexity.    Previous and current functional limitations:  (See Goal Flow Sheet for this information)    Short term and Long term goals: (See Goal Flow Sheet for this information)     Communication ability:  Patient appears to be able to clearly communicate and understand verbal and written communication and follow directions correctly.  Treatment Explanation - The following has been discussed with the patient:   RX ordered/plan of care  Anticipated outcomes  Possible risks and side effects  This patient would benefit from PT intervention to resume normal activities.   Rehab potential is good.    Frequency:  1 X week, every other  week once daily  Duration:  for 12 weeks  Discharge Plan:  Achieve all LTG.  Independent in home treatment program.  Reach maximal therapeutic benefit.    Rehabilitation plan: Supine rotator cuff program    Please refer to the daily flowsheet for treatment today, total treatment time and time spent performing 1:1 timed codes.

## 2019-07-10 NOTE — LETTER
[facility]  [address]  [place] [address]  [phone]    July 10, 2019    Re: Felicitas Avalos   :   1950  MRN:  [id number]   REFERRING PHYSICIAN:   [facility]    Date of Initial Evaluation:  7/10/19  Visits:  Rxs Used: 1  Reason for Referral:  Acute pain of right shoulder    [facility] Initial Evaluation    Subjective:  The history is provided by the patient. No  was used.   Felicitas Avalos being seen for Right shoulder.   Where condition occurred: for unknown reasons.  and reported as 4/10 on pain scale. General health as reported by patient is good. Pertinent medical history includes:  Menopausal, pain at night/rest and other. Other medical history details: High cholesterol.    Surgeries include:  None.  Current medications:  Other. Other medications details: Simvastatin for reducing high cholesterol.   Primary job tasks include:  Repetitive tasks.  Pain is described as aching and is constant. Pain is worse during the night. Since onset symptoms are gradually worsening. Special tests:  MRI and x-ray. Previous treatment includes physical therapy and other. There was none improvement following previous treatment.   Patient is Retired.   Barriers include:  None as reported by patient.  Red flags:  Pain at rest/night.    Objective:  Standing Alignment:    Cervical/Thoracic:  Forward head  Shoulder/UE:  Rounded shoulders    Gait:    Gait Type:  Normal       Flexibility/Screens:   Negative screens: Cervical     Neurological: She is alert. She has normal reflexes. No cranial nerve deficit or sensory deficit.     Shoulder Evaluation:  ROM:  AROM:    Extension/Internal Rotation:  Left:  T4    Right:  T7    PROM:    Flexion:  Left:  160    Right: 160    Internal Rotation:  Left:  90    Right:  90  External Rotation:  Left:  90    Right:  80    Strength:  : Weak and painful external rotation, internal rotation,  abduction and abduction.  Scapular dyskinesis also recognized.    Special Tests:    Right shoulder positive for the following special tests:Impingement and Acrimioclavicular    Assessment/Plan:    Patient is a 69 year old female with right side shoulder complaints.    Patient has the following significant findings with corresponding treatment plan.                Diagnosis 1: Right shoulder internal glenohumeral arthritis, and right supraspinatus tendon tear.  Pain -  hot/cold therapy, self management, education, directional preference exercise and home program  Decreased ROM/flexibility - manual therapy and therapeutic exercise  Decreased strength - therapeutic exercise and therapeutic activities    Therapy Evaluation Codes:   1) History comprised of:   Personal factors that impact the plan of care:      Age and Time since onset of symptoms.    Comorbidity factors that impact the plan of care are:      None.     Medications impacting care: Anti-inflammatory.  2) Examination of Body Systems comprised of:   Body structures and functions that impact the plan of care:      Shoulder.   Activity limitations that impact the plan of care are:      Bathing, Driving, Dressing, Lifting, Sleeping and Laying down.  3) Clinical presentation characteristics are:   Stable/Uncomplicated.  4) Decision-Making    Low complexity using standardized patient assessment instrument and/or measureable assessment of functional outcome.  Cumulative Therapy Evaluation is: Low complexity.    Previous and current functional limitations:  (See Goal Flow Sheet for this information)    Short term and Long term goals: (See Goal Flow Sheet for this information)     Communication ability:  Patient appears to be able to clearly communicate and understand verbal and written communication and follow directions correctly.  Treatment Explanation - The following has been discussed with the patient:   RX ordered/plan of care  Anticipated outcomes  Possible  risks and side effects  This patient would benefit from PT intervention to resume normal activities.   Rehab potential is good.    Frequency:  1 X week, every other week once daily  Duration:  for 12 weeks  Discharge Plan:  Achieve all LTG.  Independent in home treatment program.  Reach maximal therapeutic benefit.    Rehabilitation plan: Supine rotator cuff program    Thank you for your referral.    INQUIRIES  Therapist: Kalyan Phillips, PT, ATC, Cert. [facility]  [address]  [place] [address]  Phone: [phone]  Fax: [phone]

## 2019-08-09 ENCOUNTER — THERAPY VISIT (OUTPATIENT)
Dept: PHYSICAL THERAPY | Facility: CLINIC | Age: 69
End: 2019-08-09
Payer: COMMERCIAL

## 2019-08-09 DIAGNOSIS — G89.29 CHRONIC RIGHT SHOULDER PAIN: Primary | ICD-10-CM

## 2019-08-09 DIAGNOSIS — M25.511 CHRONIC RIGHT SHOULDER PAIN: Primary | ICD-10-CM

## 2019-08-09 PROCEDURE — 97112 NEUROMUSCULAR REEDUCATION: CPT | Mod: GP | Performed by: PHYSICAL THERAPIST

## 2019-08-09 PROCEDURE — 97110 THERAPEUTIC EXERCISES: CPT | Mod: GP | Performed by: PHYSICAL THERAPIST

## 2019-09-06 ENCOUNTER — THERAPY VISIT (OUTPATIENT)
Dept: PHYSICAL THERAPY | Facility: CLINIC | Age: 69
End: 2019-09-06
Payer: COMMERCIAL

## 2019-09-06 DIAGNOSIS — M25.511 CHRONIC RIGHT SHOULDER PAIN: Primary | ICD-10-CM

## 2019-09-06 DIAGNOSIS — G89.29 CHRONIC RIGHT SHOULDER PAIN: Primary | ICD-10-CM

## 2019-09-06 PROCEDURE — 97110 THERAPEUTIC EXERCISES: CPT | Mod: GP | Performed by: PHYSICAL THERAPIST

## 2019-09-06 NOTE — PROGRESS NOTES
Subjective:  HPI                    Objective:  System    Physical Exam    General     ROS    Assessment/Plan:    DISCHARGE REPORT    Progress reporting period is from 7/10/19 to 9/6/19. 3 visits .     SUBJECTIVE  : No change with shoulder function or pain. She has been seen for 3 visits in PT. Felicitas consulted with Dr. Hernandez and mutually have agreed to surgery planned in October Patient will under go surgery October 2019. Rehab to follow based on MD protocol.      Current Pain level: 2/10   Initial Pain level: 2/10   Changes in function: No changes noted in function since last SOAP note   Adverse reactions: None;   ,         OBJECTIVE  Objective: 70 ER , 90 IR , 150 flexion. Painful arc. Patient will direct her care with supine only exercises along with scap  stability strength. DC at this point, until post surgery       ASSESSMENT/PLAN  Updated problem list and treatment plan: Diagnosis 1:  R shoulder OA, RTC Tear         Recommendations:  This patient would benefit from further evaluation.    Please refer to the daily flowsheet for treatment today, total treatment time and time spent performing 1:1 timed codes.

## 2019-09-06 NOTE — LETTER
[facility]  [address] [place] [id number] [phone]    2019    Re: Felicitas Avalos   :   1950  MRN:  [id number]   REFERRING PHYSICIAN:   [facility]    Date of Initial Evaluation:  7/10/19  Visits:  Rxs Used: 3  Reason for Referral:  Chronic right shoulder pain    DISCHARGE REPORT  Progress reporting period is from 7/10/19 to 19. 3 visits .     SUBJECTIVE  No change with shoulder function or pain. She has been seen for 3 visits in PT. Felicitas consulted with Dr. Hernandez and mutually have agreed to surgery planned in October Patient will under go surgery 2019. Rehab to follow based on MD protocol.      Current Pain level: 2/10   Initial Pain level: 2/10   Changes in function: No changes noted in function since last SOAP note   Adverse reactions: None;   ,         OBJECTIVE  Objective: 70 ER , 90 IR , 150 flexion. Painful arc. Patient will direct her care with supine only exercises along with scap  stability strength. DC at this point, until post surgery       ASSESSMENT/PLAN  Updated problem list and treatment plan: Diagnosis 1:  R shoulder OA, RTC Tear       Recommendations:  This patient would benefit from further evaluation.    Thank you for your referral.    INQUIRIES  Therapist: Kalyan Phillips, PT, ATC, Cert. MDT  [facility]  [address] [place] [address]  Phone: [phone]  Fax: [phone]

## 2019-09-08 ENCOUNTER — MYC MEDICAL ADVICE (OUTPATIENT)
Dept: FAMILY MEDICINE | Facility: CLINIC | Age: 69
End: 2019-09-08

## 2019-09-11 ENCOUNTER — OFFICE VISIT (OUTPATIENT)
Dept: FAMILY MEDICINE | Facility: CLINIC | Age: 69
End: 2019-09-11
Payer: COMMERCIAL

## 2019-09-11 VITALS
HEART RATE: 59 BPM | OXYGEN SATURATION: 98 % | SYSTOLIC BLOOD PRESSURE: 128 MMHG | HEIGHT: 63 IN | BODY MASS INDEX: 25.96 KG/M2 | DIASTOLIC BLOOD PRESSURE: 68 MMHG | TEMPERATURE: 97.4 F | RESPIRATION RATE: 16 BRPM | WEIGHT: 146.5 LBS

## 2019-09-11 DIAGNOSIS — Z23 NEED FOR PROPHYLACTIC VACCINATION AND INOCULATION AGAINST INFLUENZA: ICD-10-CM

## 2019-09-11 DIAGNOSIS — M75.41 IMPINGEMENT SYNDROME, SHOULDER, RIGHT: ICD-10-CM

## 2019-09-11 DIAGNOSIS — E78.5 HYPERLIPIDEMIA LDL GOAL <130: ICD-10-CM

## 2019-09-11 DIAGNOSIS — M24.011 LOOSE BODY JOINT-SHOULDER, RIGHT: ICD-10-CM

## 2019-09-11 DIAGNOSIS — M75.21 BICEPS TENDONITIS, RIGHT: ICD-10-CM

## 2019-09-11 DIAGNOSIS — M75.111 NONTRAUMATIC INCOMPLETE TEAR OF ROTATOR CUFF, RIGHT: ICD-10-CM

## 2019-09-11 DIAGNOSIS — M19.011 GLENOHUMERAL ARTHRITIS, RIGHT: ICD-10-CM

## 2019-09-11 DIAGNOSIS — Z01.818 PREOP GENERAL PHYSICAL EXAM: Primary | ICD-10-CM

## 2019-09-11 LAB
ERYTHROCYTE [DISTWIDTH] IN BLOOD BY AUTOMATED COUNT: 12.6 % (ref 10–15)
HCT VFR BLD AUTO: 38.7 % (ref 35–47)
HGB BLD-MCNC: 12.6 G/DL (ref 11.7–15.7)
MCH RBC QN AUTO: 31.2 PG (ref 26.5–33)
MCHC RBC AUTO-ENTMCNC: 32.6 G/DL (ref 31.5–36.5)
MCV RBC AUTO: 96 FL (ref 78–100)
PLATELET # BLD AUTO: 302 10E9/L (ref 150–450)
RBC # BLD AUTO: 4.04 10E12/L (ref 3.8–5.2)
WBC # BLD AUTO: 6.9 10E9/L (ref 4–11)

## 2019-09-11 PROCEDURE — 85027 COMPLETE CBC AUTOMATED: CPT | Performed by: NURSE PRACTITIONER

## 2019-09-11 PROCEDURE — G0008 ADMIN INFLUENZA VIRUS VAC: HCPCS | Performed by: NURSE PRACTITIONER

## 2019-09-11 PROCEDURE — 99214 OFFICE O/P EST MOD 30 MIN: CPT | Mod: 25 | Performed by: NURSE PRACTITIONER

## 2019-09-11 PROCEDURE — 90662 IIV NO PRSV INCREASED AG IM: CPT | Performed by: NURSE PRACTITIONER

## 2019-09-11 PROCEDURE — 93000 ELECTROCARDIOGRAM COMPLETE: CPT | Performed by: NURSE PRACTITIONER

## 2019-09-11 PROCEDURE — 36415 COLL VENOUS BLD VENIPUNCTURE: CPT | Performed by: NURSE PRACTITIONER

## 2019-09-11 ASSESSMENT — MIFFLIN-ST. JEOR: SCORE: 1172.61

## 2019-09-11 NOTE — PROGRESS NOTES
[facility]  [address] [facility] [address]  [phone]  Dept: [phone]    PRE-OP EVALUATION:  Today's date: 2019    Felicitas Avalos (: 1950) presents for pre-operative evaluation assessment as requested by Dr. Mark.  She requires evaluation and anesthesia risk assessment prior to undergoing surgery/procedure for treatment of  Shoulder surgery: arthroscopic subacronmial decompression, rotator cuff repair, acromioclavicular joint resection, removal of loose body in subacromial space, glenohumeral debridgement, open subpecotral biceps tenodesis.    Fax number for surgical facility: [phone]  Primary Physician: Ana Bello  Type of Anesthesia Anticipated: to be determined    Patient has a Health Care Directive or Living Will:  YES.    Preop Questions 2019   Who is doing your surgery? Dr. Twin Hernandez, [facility], [facility]   What are you having done? Shoulder surgery: arthroscopic subacronmial decompression, rotator cuff repair, acromioclavicular joint resection, removal of loose body in subacromial space, glenohumeral debridgement, open subpecotral biceps tenodesis   Date of Surgery/Procedure: Thursday, October 3, 2019   Facility or Hospital where procedure/surgery will be performed: [facility]   1.  Do you have a history of Heart attack, stroke, stent, coronary bypass surgery, or other heart surgery? No   2.  Do you ever have any pain or discomfort in your chest? No   3.  Do you have a history of  Heart Failure? No   4.   Are you troubled by shortness of breath when:  walking on a level surface, or up a slight hill, or at night? No   5.  Do you currently have a cold, bronchitis or other respiratory infection? No   6.  Do you have a cough, shortness of breath, or wheezing? No   7.  Do you sometimes get pains in the calves of your legs when you walk? No   8. Do you or anyone in your family have previous history of  blood clots? UNKNOWN. She has not heard of anyone in immediate family having this problem.    9.  Do you or does anyone in your family have a serious bleeding problem such as prolonged bleeding following surgeries or cuts? No   10. Have you ever had problems with anemia or been told to take iron pills? No   11. Have you had any abnormal blood loss such as black, tarry or bloody stools, or abnormal vaginal bleeding? No   12. Have you ever had a blood transfusion? No   13. Have you or any of your relatives ever had problems with anesthesia? No   14. Do you have sleep apnea, excessive snoring or daytime drowsiness? No   15. Do you have any prosthetic heart valves? No   16. Do you have prosthetic joints? No   17. Is there any chance that you may be pregnant? No         HPI:     HPI related to upcoming procedure:   Pain in right shoulder started 3 years ago.  Gradually worsening over time.  Affecting sleep.  Decline in function.  Has been doing PT for 3 years, injections - temporary relief.      HYPERLIPIDEMIA - Patient has a long history of significant Hyperlipidemia requiring medication for treatment with recent good control. Patient reports no problems or side effects with the medication.       MEDICAL HISTORY:     Patient Active Problem List    Diagnosis Date Noted     Tubular adenoma of colon 02/02/2017     Priority: Medium     On colonoscopy 1/30/2017 - repeat in 5 years       ACP (advance care planning) 12/08/2016     Priority: Medium     Advance Care Planning 5/7/2017: Receipt of ACP document:  Received: Health Care Directive which was witnessed or notarized on 1/19/17.  Document previously scanned on 2/8/17.  Validation form completed and scanned.  Code Status reflects choices in most recent ACP document..  Confirmed/documented designated decision maker(s).  Added by Colleen Murry RN, Advance Care Planning Liaison.  Advance Care Planning 12/8/2016: ACP Review of Chart / Resources Provided:  Reviewed chart for  advance care plan.  Felicitas Avalos has been provided information and resources to begin or update their advance care plan.  Added by Archana Rahman  Advance Care Planning 11/29/2011: Discussed advance care planning with patient; information given to patient to review.          HYPERLIPIDEMIA LDL GOAL <130 10/31/2010     Priority: Medium     Sleep related leg cramps 05/18/2007     Priority: Medium     Pure hypercholesterolemia 06/24/2005     Priority: Medium     On lipitor - well controlled        Allergic rhinitis due to other allergen 06/24/2005     Priority: Medium     Stable with OTC meds         Bunion 06/24/2005     Priority: Medium     Managed with appropriate shoes          History reviewed. No pertinent past medical history.  Past Surgical History:   Procedure Laterality Date     COLONOSCOPY  2003     Normal     Current Outpatient Medications   Medication Sig Dispense Refill     ASPIRIN 81 MG OR TABS 1 tab po QD (Once per day) 100 3     calcium-vitamin D 500-125 MG-UNIT TABS Take 1 tablet by mouth 2 times daily       DiphenhydrAMINE HCl (BENADRYL ALLERGY PO) Take  by mouth as needed. For spring and fall allergies         Ibuprofen-Diphenhydramine Cit (ADVIL PM PO) Take  by mouth. As needed for sleep       Loratadine (ALAVERT PO) Take  by mouth as needed. For spring and fall allergies       multivitamin (THERAGRAN) per tablet take 1 Tab by mouth daily. 100 Tab 12     order for DME Equipment being ordered: Dynaflex inserts 1 Device 0     order for DME Dynaflex insert 2 Units 0     Pseudoephedrine HCl (SUDAFED PO) Take  by mouth as needed. For spring and fall allergies.       simvastatin (ZOCOR) 40 MG tablet TAKE ONE TABLET BY MOUTH EVERY NIGHT AT BEDTIME 90 tablet 3     OTC products: None, except as noted above    Allergies   Allergen Reactions     Nka [No Known Allergies]       Latex Allergy: NO    Social History     Tobacco Use     Smoking status: Never Smoker     Smokeless tobacco: Never Used   Substance  "Use Topics     Alcohol use: Yes     Comment: On rare occasions     History   Drug Use No       REVIEW OF SYSTEMS:   Constitutional, neuro, ENT, endocrine, pulmonary, cardiac, gastrointestinal, genitourinary, musculoskeletal, integument and psychiatric systems are negative, except as otherwise noted.    EXAM:   /68 (BP Location: Right arm, Patient Position: Sitting, Cuff Size: Adult Regular)   Pulse 59   Temp 97.4  F (36.3  C) (Tympanic)   Resp 16   Ht 1.607 m (5' 3.25\")   Wt 66.5 kg (146 lb 8 oz)   SpO2 98%   BMI 25.75 kg/m      GENERAL APPEARANCE: healthy, alert and no distress     EYES: EOMI, PERRL     HENT: ear canals and TM's normal and nose and mouth without ulcers or lesions     NECK: no adenopathy, no asymmetry, masses, or scars and thyroid normal to palpation     RESP: lungs clear to auscultation - no rales, rhonchi or wheezes     CV: regular rates and rhythm, normal S1 S2, no S3 or S4 and no murmur, click or rub     ABDOMEN:  soft, nontender, no HSM or masses and bowel sounds normal     SKIN: no suspicious lesions or rashes     NEURO: Normal strength and tone, sensory exam grossly normal, mentation intact and speech normal     PSYCH: mentation appears normal. and affect normal/bright     LYMPHATICS: No cervical adenopathy    DIAGNOSTICS:   EKG: sinus bradycardia, normal axis, normal intervals, no acute ST/T changes c/w ischemia, no LVH by voltage criteria      Results for orders placed or performed in visit on 09/11/19   CBC with platelets   Result Value Ref Range    WBC 6.9 4.0 - 11.0 10e9/L    RBC Count 4.04 3.8 - 5.2 10e12/L    Hemoglobin 12.6 11.7 - 15.7 g/dL    Hematocrit 38.7 35.0 - 47.0 %    MCV 96 78 - 100 fl    MCH 31.2 26.5 - 33.0 pg    MCHC 32.6 31.5 - 36.5 g/dL    RDW 12.6 10.0 - 15.0 %    Platelet Count 302 150 - 450 10e9/L       Recent Labs   Lab Test 06/03/16  1054   HGB 12.9           IMPRESSION:   Reason for surgery/procedure: incomplete tear of rotator cuff, " glenohumeral arthritis, biceps tendinitis, impingement syndrome, loose body  Diagnosis/reason for consult: Pre-op evaluation    The proposed surgical procedure is considered INTERMEDIATE risk.    REVISED CARDIAC RISK INDEX  The patient has the following serious cardiovascular risks for perioperative complications such as (MI, PE, VFib and 3  AV Block):  No serious cardiac risks  INTERPRETATION: 0 risks: Class I (very low risk - 0.4% complication rate)    The patient has the following additional risks for perioperative complications:  hyperlipidemia      ICD-10-CM    1. Preop general physical exam Z01.818 EKG 12-lead complete w/read - Clinics     CBC with platelets   2. Nontraumatic incomplete tear of rotator cuff, right M75.111 EKG 12-lead complete w/read - Clinics     CBC with platelets   3. Glenohumeral arthritis, right M19.011 EKG 12-lead complete w/read - Clinics     CBC with platelets   4. Biceps tendonitis, right M75.21 EKG 12-lead complete w/read - Clinics     CBC with platelets   5. Impingement syndrome, shoulder, right M75.41 EKG 12-lead complete w/read - Clinics     CBC with platelets   6. Loose body joint-shoulder, right M24.011 EKG 12-lead complete w/read - Clinics     CBC with platelets   7. Hyperlipidemia LDL goal <130 E78.5 EKG 12-lead complete w/read - Clinics     CBC with platelets   8. Need for prophylactic vaccination and inoculation against influenza Z23 FLU VACCINE, INCREASED ANTIGEN, PRESV FREE, AGE 65+ [72534]     Vaccine Administration, Initial [92090]       RECOMMENDATIONS:       Cardiovascular Risk  Performs 4 METs exercise without symptoms (Light housework (dusting, washing dishes), Climb a flight of stairs, Walk on level ground at 15 minutes per mile (4 miles/hour), Bicycling at 8.5 minutes per mile (7 miles/hour) and Shoveling) .       --Patient is to take all scheduled medications on the day of surgery EXCEPT for modifications listed below.    Anticoagulant or Antiplatelet Medication  Use  ASPIRIN: Discontinue ASA 7-10 days prior to procedure to reduce bleeding risk.  It should be resumed post-operatively.  NSAIDS: Stop 7 days prior to surgery.        APPROVAL GIVEN to proceed with proposed procedure, without further diagnostic evaluation       Signed Electronically by: ANGELITO Grace CNP    Copy of this evaluation report is provided to requesting physician.    [facility] Preop Guidelines    Revised Cardiac Risk Index

## 2019-09-11 NOTE — NURSING NOTE
"Initial /68 (BP Location: Right arm, Patient Position: Sitting, Cuff Size: Adult Regular)   Pulse 59   Temp 97.4  F (36.3  C) (Tympanic)   Resp 16   Ht 1.607 m (5' 3.25\")   Wt 66.5 kg (146 lb 8 oz)   SpO2 98%   BMI 25.75 kg/m   Estimated body mass index is 25.75 kg/m  as calculated from the following:    Height as of this encounter: 1.607 m (5' 3.25\").    Weight as of this encounter: 66.5 kg (146 lb 8 oz). .    Ashlee Cristobal on 9/11/2019 at 8:04 AM    "

## 2019-11-02 ENCOUNTER — MYC MEDICAL ADVICE (OUTPATIENT)
Dept: FAMILY MEDICINE | Facility: CLINIC | Age: 69
End: 2019-11-02

## 2019-11-02 ENCOUNTER — HEALTH MAINTENANCE LETTER (OUTPATIENT)
Age: 69
End: 2019-11-02

## 2019-11-08 ENCOUNTER — OFFICE VISIT (OUTPATIENT)
Dept: FAMILY MEDICINE | Facility: CLINIC | Age: 69
End: 2019-11-08
Payer: COMMERCIAL

## 2019-11-08 ENCOUNTER — ANCILLARY PROCEDURE (OUTPATIENT)
Dept: GENERAL RADIOLOGY | Facility: CLINIC | Age: 69
End: 2019-11-08
Attending: NURSE PRACTITIONER
Payer: COMMERCIAL

## 2019-11-08 VITALS
WEIGHT: 147 LBS | DIASTOLIC BLOOD PRESSURE: 60 MMHG | BODY MASS INDEX: 26.05 KG/M2 | TEMPERATURE: 97.9 F | SYSTOLIC BLOOD PRESSURE: 108 MMHG | HEART RATE: 71 BPM | HEIGHT: 63 IN | OXYGEN SATURATION: 97 %

## 2019-11-08 DIAGNOSIS — Z23 ENCOUNTER FOR VACCINATION: ICD-10-CM

## 2019-11-08 DIAGNOSIS — M25.551 HIP PAIN, RIGHT: Primary | ICD-10-CM

## 2019-11-08 DIAGNOSIS — M25.551 HIP PAIN, RIGHT: ICD-10-CM

## 2019-11-08 PROCEDURE — 90750 HZV VACC RECOMBINANT IM: CPT | Performed by: NURSE PRACTITIONER

## 2019-11-08 PROCEDURE — 73502 X-RAY EXAM HIP UNI 2-3 VIEWS: CPT

## 2019-11-08 PROCEDURE — 99213 OFFICE O/P EST LOW 20 MIN: CPT | Mod: 25 | Performed by: NURSE PRACTITIONER

## 2019-11-08 PROCEDURE — 90471 IMMUNIZATION ADMIN: CPT | Performed by: NURSE PRACTITIONER

## 2019-11-08 ASSESSMENT — MIFFLIN-ST. JEOR: SCORE: 1174.88

## 2019-11-08 NOTE — PROGRESS NOTES
"Subjective     Felicitas Avalos is a 69 year old female who presents to clinic today for the following health issues:    HPI   Musculoskeletal problem/pain      Duration: first noticed this sometime in August    Description  Location: right hip  Radiates down to right knee  Came back after she started walking last week    Intensity:  moderate    Accompanying signs and symptoms: none    History  Previous similar problem: no   Previous evaluation:  none    Precipitating or alleviating factors:  Trauma or overuse: no   Aggravating factors include: climbing stairs  Therapies tried and outcome: rest/inactivity, ice, acetaminophen and Ibuprofen- temporary relief            Reviewed and updated as needed this visit by Provider         Review of Systems   ROS COMP: Constitutional, HEENT, cardiovascular, pulmonary, gi and gu systems are negative, except as otherwise noted.      Objective    /60 (BP Location: Right arm)   Pulse 71   Temp 97.9  F (36.6  C) (Tympanic)   Ht 1.607 m (5' 3.25\")   Wt 66.7 kg (147 lb)   SpO2 97%   BMI 25.83 kg/m    Body mass index is 25.83 kg/m .  Physical Exam   GENERAL: healthy, alert and no distress  MS: right hip: appearance normal. Gait normal, ROM normal. Mild tenderness to palpation in lateral hip - no tenderness over the trochanteric bursa.            Assessment & Plan       ICD-10-CM    1. Hip pain, right M25.551 XR Pelvis and Hip Right 1 View - to assess for osteoarthritis.     PHYSICAL THERAPY REFERRAL     2. Encounter for vaccination Z23 ZOSTER VACCINE RECOMBINANT ADJUVANTED IM NJX     ADMIN 1st VACCINE              Return in about 4 weeks (around 12/6/2019), or if symptoms worsen or fail to improve.      The risks, benefits and treatment options of prescribed medications or other treatments have been discussed with the patient. The patient verbalized their understanding and should call or follow up if no improvement or if they develop further problems.    Ana Bello, " ANGELITO [facility]

## 2019-11-08 NOTE — PATIENT INSTRUCTIONS
Thank you for choosing [facility].  You may be receiving an email and/or telephone survey request from [facility] Customer Experience regarding your visit today.  Please take a few minutes to respond to the survey to let us know how we are doing.      If you have questions or concerns, please contact us via [employer] or you can contact your care team at [phone].    Our Clinic hours are:  Monday 6:40 am  to 7:00 pm  Tuesday -Friday 6:40 am to 5:00 pm    The [place] outpatient lab hours are:  Monday - Friday 6:10 am to 4:45 pm  Saturdays 7:00 am to 11:00 am  Appointments are required, call [phone]    If you have clinical questions after hours or would like to schedule an appointment,  call the clinic at [phone].

## 2019-11-08 NOTE — NURSING NOTE
Prior to immunization administration, verified patients identity using patient s name and date of birth. Please see Immunization Activity for additional information.     Screening Questionnaire for Adult Immunization    Are you sick today?   No   Do you have allergies to medications, food, a vaccine component or latex?   No   Have you ever had a serious reaction after receiving a vaccination?   No   Do you have a long-term health problem with heart disease, lung disease, asthma, kidney disease, metabolic disease (e.g. diabetes), anemia, or other blood disorder?   No   Do you have cancer, leukemia, HIV/AIDS, or any other immune system problem?   No   In the past 3 months, have you taken medications that affect  your immune system, such as prednisone, other steroids, or anticancer drugs; drugs for the treatment of rheumatoid arthritis, Crohn s disease, or psoriasis; or have you had radiation treatments?   No   Have you had a seizure, or a brain or other nervous system problem?   No   During the past year, have you received a transfusion of blood or blood     products, or been given immune (gamma) globulin or antiviral drug?   No   For women: Are you pregnant or is there a chance you could become        pregnant during the next month?   No   Have you received any vaccinations in the past 4 weeks?   No     Immunization questionnaire answers were all negative.        Per orders of , injection of Shingrix given by KIRSTEN CRONIN. Patient instructed to remain in clinic for 15 minutes afterwards, and to report any adverse reaction to me immediately.       Screening performed by KIRSTEN CRONIN on 11/8/2019 at 1:34 PM.

## 2019-11-08 NOTE — PROGRESS NOTES
"SUBJECTIVE    HPI: Felicitas Avalos is a 69-year-old female presenting today for hip pain.    R hip pain began in August. It was not preceded by an injury or trauma. It went away with rest after shoulder surgery 5 weeks ago but returned when she began walking long distances again last week (20-25 min walks). Quit walking again due to discomfort.  Pain is described as moderate. It radiates to her knee. Affects daily life- walking up stairs, but not down, and walking far. She is currently sleeping in a recliner for her shoulder, not sure if this is affecting her hip.    Aggravating factors: walking up stairs, walking distances  Alleviating factors: rest, ice/heat, ibuprofen, acetaminophen    Pertinent medical history: Has arthritis in her lower back (unknown type). 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     She will be attending physical therapy in December for her shoulder and would like to combine therapies if possible.    No past medical history on file.  Current Outpatient Medications   Medication     ASPIRIN 81 MG OR TABS     calcium-vitamin D 500-125 MG-UNIT TABS     DiphenhydrAMINE HCl (BENADRYL ALLERGY PO)     Ibuprofen-Diphenhydramine Cit (ADVIL PM PO)     Loratadine (ALAVERT PO)     multivitamin (THERAGRAN) per tablet     Pseudoephedrine HCl (SUDAFED PO)     simvastatin (ZOCOR) 40 MG tablet     Current Facility-Administered Medications   Medication     betamethasone acet & sod phos (CELESTONE) injection 6 mg     lidocaine 1 % injection 2 mL     ropivacaine (NAROPIN) injection 3 mL     triamcinolone (KENALOG-40) injection 40 mg     Past Surgical History:   Procedure Laterality Date     COLONOSCOPY  2003     Normal     ROS: ROS negative except for as listed in HPI.    OBJECTIVE  /60 (BP Location: Right arm)   Pulse 71   Temp 97.9  F (36.6  C) (Tympanic)   Ht 1.607 m (5' 3.25\")   Wt 66.7 kg (147 lb)   SpO2 97%   BMI 25.83 kg/m    Exam:  Constitutional: " healthy, alert and no distress  Musculoskeletal: extremities normal- no gross deformities noted, gait normal and normal muscle tone, ROM intact, slight tenderness to palpation of lateral hip joint    Diagnostics:  - XR pelvis and hip right 1 view: pending    ASSESSMENT/PLAN  1. Hip pain, right.  Worsening. Suspicious for osteoarthritis given symptoms and PMH. Xray will be done today to better visualize the joint. It is reasonable for her to wait until December and do therapy for both her shoulder and hip in combination.  - XR pelvis and hip right 1 view  - physical therapy referral    2. Encounter for vaccination  - zoster vaccine recombinant adjuvanted IM NJX    Pamela Condon, RN BSN  FNP student

## 2019-12-13 ENCOUNTER — THERAPY VISIT (OUTPATIENT)
Dept: PHYSICAL THERAPY | Facility: CLINIC | Age: 69
End: 2019-12-13
Payer: COMMERCIAL

## 2019-12-13 DIAGNOSIS — G89.18 ACUTE POSTOPERATIVE PAIN OF RIGHT SHOULDER: Primary | ICD-10-CM

## 2019-12-13 DIAGNOSIS — M25.511 ACUTE POSTOPERATIVE PAIN OF RIGHT SHOULDER: Primary | ICD-10-CM

## 2019-12-13 DIAGNOSIS — M25.551 HIP PAIN, RIGHT: ICD-10-CM

## 2019-12-13 PROCEDURE — 97112 NEUROMUSCULAR REEDUCATION: CPT | Mod: GP | Performed by: PHYSICAL THERAPIST

## 2019-12-13 PROCEDURE — 97110 THERAPEUTIC EXERCISES: CPT | Mod: GP | Performed by: PHYSICAL THERAPIST

## 2019-12-13 PROCEDURE — 97161 PT EVAL LOW COMPLEX 20 MIN: CPT | Mod: GP | Performed by: PHYSICAL THERAPIST

## 2019-12-13 NOTE — LETTER
[facility]  [address]  [place] [address]  [phone]    2019    Re: Felicitas Avalos   :   1950  MRN:  [id number]   REFERRING PHYSICIAN:   [facility]    Date of Initial Evaluation:  19  Visits:  Rxs Used: 1  Reason for Referral:     Hip pain, right  Acute postoperative pain of right shoulder    EVALUATION SUMMARY    [facility] Initial Evaluation  Subjective:  The history is provided by the patient. No  was used.   Felicitas Avalos being seen for PT on surgically repaired shoulder. If time, PT for lower back..   Where condition occurred: other.Problem occurred: I was not injured  and reported as 1/10 on pain scale. General health as reported by patient is good. Pertinent medical history includes:  Osteoarthritis.    Surgeries include:  Orthopedic surgery.  Current medications:  Pain medication.   Primary job tasks include:  Repetitive tasks.  Pain is described as other and is intermittent. Pain is worse in the P.M.. Since onset symptoms are rapidly improving. Special tests:  MRI and x-ray. Previous treatment includes physical therapy, surgery and other. There was significant improvement following previous treatment.      Barriers include:  None as reported by patient.  Red flags:  Other.            Objective:  Standing Alignment:    Cervical/Thoracic:  Forward head  Shoulder/UE:  Rounded shoulders and scapular winging R    Gait:    Gait Type:  Normal   Weight Bearing Status:  WBAT   Assistive Devices:  None  Flexibility/Screens:   Positive screens:  LumbarNegative screens: Cervical   Neurological: She is alert. She has normal strength and normal reflexes. No cranial nerve deficit or sensory deficit.     Shoulder Evaluation:  ROM:    PROM:    Flexion:  Left:  170    Right: 100    Internal Rotation:  Left:  90    Right:  65  External Rotation:  Left:  90    Right:  20    Strength:  not assessed    Special Tests:  not  assessed    Palpation:    Right shoulder tenderness present at: Incisional       Assessment/Plan:    Patient is a 69 year old female with right side shoulder complaints.    Patient has the following significant findings with corresponding treatment plan.       Diagnosis 1:  R post op arthroscopic RCR, open biceps tenodesis     Diagnosis 2:  Hip/low back pain       Therapy Evaluation Codes:   1) History comprised of:   Personal factors that impact the plan of care:      post operative rehabilitation protocol. .    Comorbidity factors that impact the plan of care are:      Menopausal and Osteoarthritis.     Medications impacting care: Pain.  2) Examination of Body Systems comprised of:   Body structures and functions that impact the plan of care:      Shoulder.   Activity limitations that impact the plan of care are:      Bathing, Cooking, Driving, Dressing, Lifting, Reading/Computer work, Working, Sleeping and Laying down.  3) Clinical presentation characteristics are:   Stable/Uncomplicated.  4) Decision-Making    Low complexity using standardized patient assessment instrument and/or measureable assessment of functional outcome.    Cumulative Therapy Evaluation is: Low complexity.  Previous and current functional limitations:  (See Goal Flow Sheet for this information)    Short term and Long term goals: (See Goal Flow Sheet for this information)   Communication ability:  Patient appears to be able to clearly communicate and understand verbal and written communication and follow directions correctly.  Treatment Explanation - The following has been discussed with the patient:   RX ordered/plan of care  Anticipated outcomes  Possible risks and side effects  This patient would benefit from PT intervention to resume normal activities.   Rehab potential is good.  Frequency:  1 X week, once daily  Duration:  for 8 weeks  Discharge Plan:  Achieve all LTG.  Independent in home treatment program.  Reach maximal therapeutic  benefit.    Rehabilitation Plans: [facility] post op RCR Protocol.     Thank you for your referral.    INQUIRIES  Therapist:Kalyan Phillips, PT, ATC, Cert. [facility]  [address]  [place] [address]  Phone: [phone]  Fax: [phone]

## 2019-12-16 NOTE — PROGRESS NOTES
[facility] Initial Evaluation  Subjective:  The history is provided by the patient. No  was used.   Felicitas Avalos being seen for PT on surgically repaired shoulder. If time, PT for lower back..   Where condition occurred: other.Problem occurred: I was not injured  and reported as 1/10 on pain scale. General health as reported by patient is good. Pertinent medical history includes:  Osteoarthritis.    Surgeries include:  Orthopedic surgery.  Current medications:  Pain medication.   Primary job tasks include:  Repetitive tasks.  Pain is described as other and is intermittent. Pain is worse in the P.M.. Since onset symptoms are rapidly improving. Special tests:  MRI and x-ray. Previous treatment includes physical therapy, surgery and other. There was significant improvement following previous treatment.      Barriers include:  None as reported by patient.  Red flags:  Other.                      Objective:  Standing Alignment:    Cervical/Thoracic:  Forward head  Shoulder/UE:  Rounded shoulders and scapular winging R              Gait:    Gait Type:  Normal   Weight Bearing Status:  WBAT   Assistive Devices:  None      Flexibility/Screens:   Positive screens:  LumbarNegative screens: Cervical           Neurological: She is alert. She has normal strength and normal reflexes. No cranial nerve deficit or sensory deficit.                      Shoulder Evaluation:  ROM:    PROM:    Flexion:  Left:  170    Right: 100          Internal Rotation:  Left:  90    Right:  65  External Rotation:  Left:  90    Right:  20                    Strength:  not assessed                        Special Tests:  not assessed      Palpation:      Right shoulder tenderness present at: Incisional                                     General     ROS    Assessment/Plan:    Patient is a 69 year old female with right side shoulder complaints.    Patient has the following significant findings with corresponding  treatment plan.                  Diagnosis 1:  R post op arthroscopic RCR, open biceps tenodesis       Diagnosis 2:  Hip/low back pain         Therapy Evaluation Codes:   1) History comprised of:   Personal factors that impact the plan of care:      post operative rehabilitation protocol. .    Comorbidity factors that impact the plan of care are:      Menopausal and Osteoarthritis.     Medications impacting care: Pain.  2) Examination of Body Systems comprised of:   Body structures and functions that impact the plan of care:      Shoulder.   Activity limitations that impact the plan of care are:      Bathing, Cooking, Driving, Dressing, Lifting, Reading/Computer work, Working, Sleeping and Laying down.  3) Clinical presentation characteristics are:   Stable/Uncomplicated.  4) Decision-Making    Low complexity using standardized patient assessment instrument and/or measureable assessment of functional outcome.  Cumulative Therapy Evaluation is: Low complexity.    Previous and current functional limitations:  (See Goal Flow Sheet for this information)    Short term and Long term goals: (See Goal Flow Sheet for this information)     Communication ability:  Patient appears to be able to clearly communicate and understand verbal and written communication and follow directions correctly.  Treatment Explanation - The following has been discussed with the patient:   RX ordered/plan of care  Anticipated outcomes  Possible risks and side effects  This patient would benefit from PT intervention to resume normal activities.   Rehab potential is good.    Frequency:  1 X week, once daily  Duration:  for 8 weeks  Discharge Plan:  Achieve all LTG.  Independent in home treatment program.  Reach maximal therapeutic benefit.    Rehabilitation Plans: [facility] post op RCR Protocol.     Please refer to the daily flowsheet for treatment today, total treatment time and time spent performing 1:1 timed codes.

## 2019-12-27 ENCOUNTER — THERAPY VISIT (OUTPATIENT)
Dept: PHYSICAL THERAPY | Facility: CLINIC | Age: 69
End: 2019-12-27
Payer: COMMERCIAL

## 2019-12-27 DIAGNOSIS — M25.511 ACUTE POSTOPERATIVE PAIN OF RIGHT SHOULDER: Primary | ICD-10-CM

## 2019-12-27 DIAGNOSIS — G89.18 ACUTE POSTOPERATIVE PAIN OF RIGHT SHOULDER: Primary | ICD-10-CM

## 2019-12-27 PROCEDURE — 97110 THERAPEUTIC EXERCISES: CPT | Mod: GP | Performed by: PHYSICAL THERAPIST

## 2019-12-27 PROCEDURE — 97112 NEUROMUSCULAR REEDUCATION: CPT | Mod: GP | Performed by: PHYSICAL THERAPIST

## 2019-12-31 ENCOUNTER — OFFICE VISIT (OUTPATIENT)
Dept: FAMILY MEDICINE | Facility: CLINIC | Age: 69
End: 2019-12-31
Payer: COMMERCIAL

## 2019-12-31 VITALS
OXYGEN SATURATION: 98 % | SYSTOLIC BLOOD PRESSURE: 110 MMHG | TEMPERATURE: 98.3 F | HEIGHT: 63 IN | WEIGHT: 151.1 LBS | HEART RATE: 64 BPM | BODY MASS INDEX: 26.77 KG/M2 | RESPIRATION RATE: 20 BRPM | DIASTOLIC BLOOD PRESSURE: 68 MMHG

## 2019-12-31 DIAGNOSIS — K59.00 CONSTIPATION, UNSPECIFIED CONSTIPATION TYPE: Primary | ICD-10-CM

## 2019-12-31 DIAGNOSIS — E78.00 PURE HYPERCHOLESTEROLEMIA: ICD-10-CM

## 2019-12-31 LAB
CHOLEST SERPL-MCNC: 186 MG/DL
HDLC SERPL-MCNC: 68 MG/DL
LDLC SERPL CALC-MCNC: 110 MG/DL
NONHDLC SERPL-MCNC: 118 MG/DL
TRIGL SERPL-MCNC: 42 MG/DL

## 2019-12-31 PROCEDURE — 80061 LIPID PANEL: CPT | Performed by: NURSE PRACTITIONER

## 2019-12-31 PROCEDURE — 36415 COLL VENOUS BLD VENIPUNCTURE: CPT | Performed by: NURSE PRACTITIONER

## 2019-12-31 PROCEDURE — 99214 OFFICE O/P EST MOD 30 MIN: CPT | Performed by: NURSE PRACTITIONER

## 2019-12-31 RX ORDER — IBUPROFEN 200 MG
600 TABLET ORAL EVERY 4 HOURS PRN
COMMUNITY

## 2019-12-31 RX ORDER — SIMVASTATIN 40 MG
TABLET ORAL
Qty: 90 TABLET | Refills: 3 | Status: SHIPPED | OUTPATIENT
Start: 2019-12-31 | End: 2021-03-16

## 2019-12-31 RX ORDER — ACETAMINOPHEN 500 MG
1000 TABLET ORAL
COMMUNITY

## 2019-12-31 RX ORDER — NAPROXEN SODIUM 220 MG
440 TABLET ORAL
COMMUNITY

## 2019-12-31 ASSESSMENT — MIFFLIN-ST. JEOR: SCORE: 1193.48

## 2019-12-31 ASSESSMENT — PAIN SCALES - GENERAL: PAINLEVEL: NO PAIN (1)

## 2019-12-31 NOTE — PROGRESS NOTES
"Subjective     Felicitas Avalos is a 69 year old female who presents to clinic today for the following health issues:  Chief Complaint   Patient presents with     Abdominal Pain       ABDOMINAL   PAIN     Onset: x 4-6 weeks ago     Description:   Character: Dull ache - comes and goes  can be sharp at times - with certain movements   Location: left lower quadrant  Radiation: None    Intensity: mild to moderate    Progression of Symptoms:  waxing and waning    Accompanying Signs & Symptoms:  Fever/Chills?: no   Gas/Bloating: YES- gas  Nausea: no   Vomitting: no   Diarrhea?: no   Constipation:no   Dysuria or Hematuria: no    History:   Trauma: no   Previous similar pain: no    Previous tests done: none    Precipitating factors:   Does the pain change with:     Food: no      BM: YES- does alleveiate the pain     Urination: no     Alleviating factors:  Having a BM     Therapies Tried and outcome: Increased fiber intake which has made her have more frequent stools, which does alleviate the pain . She did have diverticulosis on her last colonoscopy, is wondering if this could be diverticulitis?    LMP:  not applicable     Hyperlipidemia Follow-Up      Are you regularly taking any medication or supplement to lower your cholesterol?   Yes- statin    Are you having muscle aches or other side effects that you think could be caused by your cholesterol lowering medication?  No            Reviewed and updated as needed this visit by Provider         Review of Systems   ROS COMP: Constitutional, HEENT, cardiovascular, pulmonary, gi and gu systems are negative, except as otherwise noted.      Objective    /68 (BP Location: Right arm, Patient Position: Chair, Cuff Size: Adult Regular)   Pulse 64   Temp 98.3  F (36.8  C) (Tympanic)   Resp 20   Ht 1.607 m (5' 3.25\")   Wt 68.5 kg (151 lb 1.6 oz)   SpO2 98%   Breastfeeding No   BMI 26.55 kg/m    Body mass index is 26.55 kg/m .  Physical Exam   GENERAL: healthy, alert and no " distress  ABDOMEN: soft, nontender, no hepatosplenomegaly, no masses and bowel sounds normal      Assessment & Plan       ICD-10-CM    1. Constipation, unspecified constipation type K59.00 Constipation post-op is likely cause of her pain.  Pain is relieved by taking Metamucil to have regular stools.  Advice below.    Discussed signs and symptoms of diverticulitis and when to seek medical attention.     2. PURE HYPERCHOLESTEROLEM E78.00 Lipid panel reflex to direct LDL Fasting - today     simvastatin (ZOCOR) 40 MG tablet - continue daily.        Patient Instructions   For constipation:   1. Consume at least 8 glasses of water per day   2. Exercise for at least 30 minutes every day. Regular exercise helps to keep your digestive system active and and healthy and may help with constipation.   3. Take advantage of opportunities - you are more like to have a bowel movement after waking and after eating. Do not postpone having a bowel movement if you feel the urge to go.  4. Increase dietary fiber. Goal of 25 grams per day for women, 35 grams per day for men. If unable to consume 25-35 grams through diet alone consider OTC supplements. Metamucil is the best choice. It requires the use of plenty of water to be effective. Can take days to weeks to take effect.  5. Prunes can be just as effective as Metamucil. 10 prunes = 6 grams of fiber.  6. Recommend taking Miralax (17 grams = 1 scoop). Take once daily, can mix with anything. If you experience increasing bowel movements and diarrhea, decrease to every other day or every 3rd day. Miralax is an osmotic laxative that increases the amount of water secreted by the intestines resulting in softer and easier to pass stools. It can take 1-4 days to work. It may be taken chronically if you drink enough water throughout the day.  7. If Miralax isn't enough, recommend stimulant laxative such as Senna, Ex Lax or Dulcolax. Stimulant laxatives speed up the colonic motility of your gut  helping to induce a bowel movement. Take as needed at bedtime.  8. If you are still having difficulties with constipation may also add a stool softener to your bowel regimen such a Docusate.         Thank you for choosing [facility].  You may be receiving an email and/or telephone survey request from [facility] Customer Experience regarding your visit today.  Please take a few minutes to respond to the survey to let us know how we are doing.      If you have questions or concerns, please contact us via [employer] or you can contact your care team at [phone].    Our Clinic hours are:  Monday 6:40 am  to 7:00 pm  Tuesday -Friday 6:40 am to 5:00 pm    The [place] outpatient lab hours are:  Monday - Friday 6:10 am to 4:45 pm  Saturdays 7:00 am to 11:00 am  Appointments are required, call [phone]    If you have clinical questions after hours or would like to schedule an appointment,  call the clinic at [phone].        Return in about 4 weeks (around 1/28/2020) for Physical Exam.    The risks, benefits and treatment options of prescribed medications or other treatments have been discussed with the patient. The patient verbalized their understanding and should call or follow up if no improvement or if they develop further problems.    ANGELITO [facility]

## 2019-12-31 NOTE — PATIENT INSTRUCTIONS
For constipation:   1. Consume at least 8 glasses of water per day   2. Exercise for at least 30 minutes every day. Regular exercise helps to keep your digestive system active and and healthy and may help with constipation.   3. Take advantage of opportunities - you are more like to have a bowel movement after waking and after eating. Do not postpone having a bowel movement if you feel the urge to go.  4. Increase dietary fiber. Goal of 25 grams per day for women, 35 grams per day for men. If unable to consume 25-35 grams through diet alone consider OTC supplements. Metamucil is the best choice. It requires the use of plenty of water to be effective. Can take days to weeks to take effect.  5. Prunes can be just as effective as Metamucil. 10 prunes = 6 grams of fiber.  6. Recommend taking Miralax (17 grams = 1 scoop). Take once daily, can mix with anything. If you experience increasing bowel movements and diarrhea, decrease to every other day or every 3rd day. Miralax is an osmotic laxative that increases the amount of water secreted by the intestines resulting in softer and easier to pass stools. It can take 1-4 days to work. It may be taken chronically if you drink enough water throughout the day.  7. If Miralax isn't enough, recommend stimulant laxative such as Senna, Ex Lax or Dulcolax. Stimulant laxatives speed up the colonic motility of your gut helping to induce a bowel movement. Take as needed at bedtime.  8. If you are still having difficulties with constipation may also add a stool softener to your bowel regimen such a Docusate.         Thank you for choosing [facility].  You may be receiving an email and/or telephone survey request from [facility] Customer Experience regarding your visit today.  Please take a few minutes to respond to the survey to let us know how we are doing.      If you have questions or concerns, please contact us via [employer] or you can contact your care team at  [phone].    Our Clinic hours are:  Monday 6:40 am  to 7:00 pm  Tuesday -Friday 6:40 am to 5:00 pm    The [place] outpatient lab hours are:  Monday - Friday 6:10 am to 4:45 pm  Saturdays 7:00 am to 11:00 am  Appointments are required, call [phone]    If you have clinical questions after hours or would like to schedule an appointment,  call the clinic at [phone].

## 2020-01-10 ENCOUNTER — THERAPY VISIT (OUTPATIENT)
Dept: PHYSICAL THERAPY | Facility: CLINIC | Age: 70
End: 2020-01-10
Payer: COMMERCIAL

## 2020-01-10 DIAGNOSIS — G89.18 ACUTE POSTOPERATIVE PAIN OF RIGHT SHOULDER: Primary | ICD-10-CM

## 2020-01-10 DIAGNOSIS — M25.511 ACUTE POSTOPERATIVE PAIN OF RIGHT SHOULDER: Primary | ICD-10-CM

## 2020-01-10 PROCEDURE — 97110 THERAPEUTIC EXERCISES: CPT | Mod: GP | Performed by: PHYSICAL THERAPIST

## 2020-01-10 PROCEDURE — 97112 NEUROMUSCULAR REEDUCATION: CPT | Mod: GP | Performed by: PHYSICAL THERAPIST

## 2020-01-31 ENCOUNTER — THERAPY VISIT (OUTPATIENT)
Dept: PHYSICAL THERAPY | Facility: CLINIC | Age: 70
End: 2020-01-31
Payer: COMMERCIAL

## 2020-01-31 DIAGNOSIS — Z98.890 S/P ROTATOR CUFF REPAIR: Primary | ICD-10-CM

## 2020-01-31 DIAGNOSIS — M25.511 ACUTE PAIN OF RIGHT SHOULDER: ICD-10-CM

## 2020-01-31 PROCEDURE — 97110 THERAPEUTIC EXERCISES: CPT | Mod: GP | Performed by: PHYSICAL THERAPIST

## 2020-01-31 PROCEDURE — 97112 NEUROMUSCULAR REEDUCATION: CPT | Mod: GP | Performed by: PHYSICAL THERAPIST

## 2020-02-20 ENCOUNTER — HOSPITAL ENCOUNTER (OUTPATIENT)
Dept: MAMMOGRAPHY | Facility: CLINIC | Age: 70
Discharge: HOME | End: 2020-02-20
Attending: NURSE PRACTITIONER | Admitting: NURSE PRACTITIONER
Payer: COMMERCIAL

## 2020-02-20 DIAGNOSIS — Z12.31 VISIT FOR SCREENING MAMMOGRAM: ICD-10-CM

## 2020-02-20 PROCEDURE — 77067 SCR MAMMO BI INCL CAD: CPT

## 2020-02-25 ASSESSMENT — ENCOUNTER SYMPTOMS
BREAST MASS: 0
DIZZINESS: 0
PALPITATIONS: 0
CHILLS: 0
HEMATURIA: 0
FREQUENCY: 0
NAUSEA: 0
NERVOUS/ANXIOUS: 0
JOINT SWELLING: 0
ARTHRALGIAS: 0
DIARRHEA: 0
CONSTIPATION: 0
SORE THROAT: 0
HEADACHES: 0
FEVER: 0
PARESTHESIAS: 0
MYALGIAS: 0
EYE PAIN: 0
HEMATOCHEZIA: 0
DYSURIA: 0
COUGH: 0
SHORTNESS OF BREATH: 0
ABDOMINAL PAIN: 0
WEAKNESS: 0
HEARTBURN: 0

## 2020-02-25 ASSESSMENT — ACTIVITIES OF DAILY LIVING (ADL): CURRENT_FUNCTION: NO ASSISTANCE NEEDED

## 2020-02-28 ENCOUNTER — THERAPY VISIT (OUTPATIENT)
Dept: PHYSICAL THERAPY | Facility: CLINIC | Age: 70
End: 2020-02-28
Payer: COMMERCIAL

## 2020-02-28 DIAGNOSIS — Z98.890 S/P ROTATOR CUFF REPAIR: Primary | ICD-10-CM

## 2020-02-28 PROCEDURE — 97110 THERAPEUTIC EXERCISES: CPT | Mod: GP | Performed by: PHYSICAL THERAPIST

## 2020-02-28 PROCEDURE — 97112 NEUROMUSCULAR REEDUCATION: CPT | Mod: GP | Performed by: PHYSICAL THERAPIST

## 2020-02-28 ASSESSMENT — ENCOUNTER SYMPTOMS
MYALGIAS: 0
SHORTNESS OF BREATH: 0
EYE PAIN: 0
NERVOUS/ANXIOUS: 0
ARTHRALGIAS: 0
FEVER: 0
CHILLS: 0
WEAKNESS: 0
COUGH: 0
NAUSEA: 0
PARESTHESIAS: 0
JOINT SWELLING: 0
FREQUENCY: 0
HEMATURIA: 0
PALPITATIONS: 0
DYSURIA: 0
ABDOMINAL PAIN: 0
DIZZINESS: 0
HEMATOCHEZIA: 0
HEARTBURN: 0
CONSTIPATION: 0
HEADACHES: 0
BREAST MASS: 0
DIARRHEA: 0
SORE THROAT: 0

## 2020-02-28 ASSESSMENT — ACTIVITIES OF DAILY LIVING (ADL): CURRENT_FUNCTION: NO ASSISTANCE NEEDED

## 2020-02-28 NOTE — PROGRESS NOTES
"SUBJECTIVE:   Felicitas Avalos is a 69 year old female who presents for Preventive Visit.    Are you in the first 12 months of your Medicare coverage?  No    Healthy Habits:     In general, how would you rate your overall health?  Good    Frequency of exercise:  6-7 days/week    Duration of exercise:  15-30 minutes    Do you usually eat at least 4 servings of fruit and vegetables a day, include whole grains    & fiber and avoid regularly eating high fat or \"junk\" foods?  Yes    Taking medications regularly:  Yes    Medication side effects:  None    Ability to successfully perform activities of daily living:  No assistance needed    Home Safety:  No safety concerns identified    Hearing Impairment:  No hearing concerns    In the past 6 months, have you been bothered by leaking of urine?  No    In general, how would you rate your overall mental or emotional health?  Excellent      PHQ-2 Total Score: 0    Additional concerns today:  No    Do you feel safe in your environment? Yes    Have you ever done Advance Care Planning? (For example, a Health Directive, POLST, or a discussion with a medical provider or your loved ones about your wishes): Yes, advance care planning is on file.      Fall risk  Fallen 2 or more times in the past year?: No  Any fall with injury in the past year?: No  click delete button to remove this line now  Cognitive Screening   1) Repeat 3 items (Leader, Season, Table)    2) Clock draw: NORMAL  3) 3 item recall: Recalls 3 objects  Results: 3 items recalled: COGNITIVE IMPAIRMENT LESS LIKELY    Mini-CogTM Copyright GABRIEL Rivera. Licensed by the author for use in [facility]; reprinted with permission (sonara@.[email or web address]). All rights reserved.      Do you have sleep apnea, excessive snoring or daytime drowsiness?: no    Reviewed and updated as needed this visit by clinical staff  Tobacco  Allergies  Meds         Reviewed and updated as needed this visit by Provider        Social History "     Tobacco Use     Smoking status: Never Smoker     Smokeless tobacco: Never Used   Substance Use Topics     Alcohol use: Yes     Comment: On rare occasions         Alcohol Use 2/25/2020   Prescreen: >3 drinks/day or >7 drinks/week? No   Prescreen: >3 drinks/day or >7 drinks/week? -         Current providers sharing in care for this patient include:   Patient Care Team:  Ana Bello APRN CNP as PCP - General (Nurse Practitioner)  Ana Bello APRN CNP as Assigned PCP  Ana Bello APRN CNP as Referring Physician (Nurse Practitioner)  Aliza Pace PA as Physician Assistant (Physician Assistant)    The following health maintenance items are reviewed in Epic and correct as of today:  Health Maintenance   Topic Date Due     FALL RISK ASSESSMENT  02/14/2020     ZOSTER IMMUNIZATION (3 of 3) 01/03/2020     MEDICARE ANNUAL WELLNESS VISIT  02/14/2020     COLONOSCOPY  01/30/2022     MAMMO SCREENING  02/20/2022     ADVANCE CARE PLANNING  05/07/2022     LIPID  12/31/2024     DTAP/TDAP/TD IMMUNIZATION (4 - Td) 01/29/2026     DEXA  Completed     HEPATITIS C SCREENING  Completed     PHQ-2  Completed     INFLUENZA VACCINE  Completed     PNEUMOCOCCAL IMMUNIZATION 65+ LOW/MEDIUM RISK  Completed     IPV IMMUNIZATION  Aged Out     MENINGITIS IMMUNIZATION  Aged Out           Review of Systems   Constitutional: Negative for chills and fever.   HENT: Negative for congestion, ear pain, hearing loss and sore throat.    Eyes: Negative for pain and visual disturbance.   Respiratory: Negative for cough and shortness of breath.    Cardiovascular: Negative for chest pain, palpitations and peripheral edema.   Gastrointestinal: Negative for abdominal pain, constipation, diarrhea, heartburn, hematochezia and nausea.   Breasts:  Negative for tenderness, breast mass and discharge.   Genitourinary: Negative for dysuria, frequency, genital sores, hematuria, pelvic pain, urgency, vaginal bleeding and vaginal discharge.  "  Musculoskeletal: Negative for arthralgias, joint swelling and myalgias.   Skin: Negative for rash.   Neurological: Negative for dizziness, weakness, headaches and paresthesias.   Psychiatric/Behavioral: Negative for mood changes. The patient is not nervous/anxious.          OBJECTIVE:   /62 (BP Location: Right arm)   Pulse 77   Temp 97.4  F (36.3  C) (Tympanic)   Resp 20   Ht 1.6 m (5' 3\")   Wt 68.9 kg (152 lb)   SpO2 99%   BMI 26.93 kg/m   Estimated body mass index is 26.93 kg/m  as calculated from the following:    Height as of this encounter: 1.6 m (5' 3\").    Weight as of this encounter: 68.9 kg (152 lb).  Physical Exam  GENERAL APPEARANCE: healthy, alert and no distress  EYES: Eyes grossly normal to inspection, PERRL and conjunctivae and sclerae normal  HENT: ear canals and TM's normal, nose and mouth without ulcers or lesions, oropharynx clear and oral mucous membranes moist  NECK: no adenopathy, no asymmetry, masses, or scars and thyroid normal to palpation  RESP: lungs clear to auscultation - no rales, rhonchi or wheezes  BREAST: normal without masses, tenderness or nipple discharge and no palpable axillary masses or adenopathy  CV: regular rate and rhythm, normal S1 S2, no S3 or S4, no murmur, click or rub, no peripheral edema and peripheral pulses strong  ABDOMEN: soft, nontender, no hepatosplenomegaly, no masses and bowel sounds normal   (female): normal female external genitalia and normal urethral meatus  MS: no musculoskeletal defects are noted and gait is age appropriate without ataxia  SKIN: no suspicious lesions or rashes  NEURO: Normal strength and tone, sensory exam grossly normal, mentation intact and speech normal  PSYCH: mentation appears normal and affect normal/bright        ASSESSMENT / PLAN:       ICD-10-CM    1. Encounter for Medicare annual wellness exam Z00.00        COUNSELING:  Reviewed preventive health counseling, as reflected in patient instructions    Estimated " "body mass index is 26.93 kg/m  as calculated from the following:    Height as of this encounter: 1.6 m (5' 3\").    Weight as of this encounter: 68.9 kg (152 lb).         reports that she has never smoked. She has never used smokeless tobacco.      Appropriate preventive services were discussed with this patient, including applicable screening as appropriate for cardiovascular disease, diabetes, osteopenia/osteoporosis, and glaucoma.  As appropriate for age/gender, discussed screening for colorectal cancer, prostate cancer, breast cancer, and cervical cancer. Checklist reviewing preventive services available has been given to the patient.    Reviewed patients plan of care and provided an AVS. The Basic Care Plan (routine screening as documented in Health Maintenance) for Felicitas meets the Care Plan requirement. This Care Plan has been established and reviewed with the Patient.    ANGELITO [facility]    Identified Health Risks:  "

## 2020-02-28 NOTE — PROGRESS NOTES
Subjective:  HPI  Physical Exam                    Objective:  System    Physical Exam    General     ROS    Assessment/Plan:    PROGRESS  REPORT        SUBJECTIVE  Subjective: 5 months post op SAD, RCR      Feeling well.  And happy with out come       Current Pain level: 1/10   Initial Pain level: 3/10               OBJECTIVE   165 flexion ,     ER 70,     IR 90,     POsterior reach T7.     Advanced to final stage of  RCR program.     Follow up with MD in 2 weeks       ASSESSMENT/PLAN  Updated problem list and treatment plan: Diagnosis 1:  R RCR , SAD       Assessment of Progress: The patient's condition is improving.  The patient's condition has potential to improve.  Self Management Plans:  Patient has been instructed in a home treatment program.      Recommendations:  This patient would benefit from further evaluation.    Please refer to the daily flowsheet for treatment today, total treatment time and time spent performing 1:1 timed codes.

## 2020-02-28 NOTE — LETTER
[facility]  [address]  [place] [id number] [phone]    2020    Re: Felicitas Avalos   :   1950  MRN:  [id number]   REFERRING PHYSICIAN:   [facility]    Date of Initial Evaluation:  19  Visits:  Rxs Used: 5  Reason for Referral:  S/P rotator cuff repair    PROGRESS  REPORT    SUBJECTIVE  Subjective: 5 months post op SAD, RCR    Feeling well.  And happy with out come   Current Pain level: 1/10   Initial Pain level: 3/10     OBJECTIVE  165 flexion  ER 70,   IR 90,   POsterior reach T7.  Advanced to final stage of  RCR program.  Follow up with MD in 2 weeks       ASSESSMENT/PLAN  Updated problem list and treatment plan: Diagnosis 1:  R RCR , SAD     Assessment of Progress: The patient's condition is improving.  The patient's condition has potential to improve.  Self Management Plans:  Patient has been instructed in a home treatment program.    Recommendations:  This patient would benefit from further evaluation.    Thank you for your referral.    INQUIRIES  Therapist: Kalyan Phillips, PT, ATC, Cert. MDT  [facility]  [address]  [place] [address]  Phone: [phone]  Fax: [phone]

## 2020-03-02 ENCOUNTER — OFFICE VISIT (OUTPATIENT)
Dept: FAMILY MEDICINE | Facility: CLINIC | Age: 70
End: 2020-03-02
Payer: COMMERCIAL

## 2020-03-02 VITALS
RESPIRATION RATE: 20 BRPM | SYSTOLIC BLOOD PRESSURE: 110 MMHG | TEMPERATURE: 97.4 F | HEART RATE: 77 BPM | OXYGEN SATURATION: 99 % | DIASTOLIC BLOOD PRESSURE: 62 MMHG | BODY MASS INDEX: 26.93 KG/M2 | HEIGHT: 63 IN | WEIGHT: 152 LBS

## 2020-03-02 DIAGNOSIS — Z00.00 ENCOUNTER FOR MEDICARE ANNUAL WELLNESS EXAM: Primary | ICD-10-CM

## 2020-03-02 PROCEDURE — 99397 PER PM REEVAL EST PAT 65+ YR: CPT | Mod: 25 | Performed by: NURSE PRACTITIONER

## 2020-03-02 PROCEDURE — 90750 HZV VACC RECOMBINANT IM: CPT | Performed by: NURSE PRACTITIONER

## 2020-03-02 PROCEDURE — 90471 IMMUNIZATION ADMIN: CPT | Performed by: NURSE PRACTITIONER

## 2020-03-02 RX ORDER — SIMVASTATIN 40 MG
TABLET ORAL
Qty: 90 TABLET | Refills: 3 | Status: CANCELLED | OUTPATIENT
Start: 2020-03-02

## 2020-03-02 ASSESSMENT — MIFFLIN-ST. JEOR: SCORE: 1193.6

## 2020-03-02 NOTE — PATIENT INSTRUCTIONS
Preventative Care Visits include: Yearly physicals, Well-child visits, Welcome to Medicare visits, & Medicare yearly wellness exams.    The purpose of these visits is to discuss your medical history and prevent health problems before you are sick.  You may need to pay a copay, coinsurance or deductible if your visit today includes testing or treating for a new or existing condition.    Additional charges may be incurred for today's visit. If you have questions about what your insurance plan covers, please contact your Insurance Company's member service department.  If you have questions specific to a bill you have already received from [employer], please contact the [employer] Billing Office at [phone].     Patient Education   Personalized Prevention Plan  You are due for the preventive services outlined below.  Your care team is available to assist you in scheduling these services.  If you have already completed any of these items, please share that information with your care team to update in your medical record.  Health Maintenance Due   Topic Date Due     FALL RISK ASSESSMENT  02/14/2020     Zoster (Shingles) Vaccine (3 of 3) 01/03/2020     Annual Wellness Visit  02/14/2020

## 2020-03-16 ENCOUNTER — MYC MEDICAL ADVICE (OUTPATIENT)
Dept: PHYSICAL THERAPY | Facility: CLINIC | Age: 70
End: 2020-03-16

## 2020-03-25 ENCOUNTER — THERAPY VISIT (OUTPATIENT)
Dept: PHYSICAL THERAPY | Facility: CLINIC | Age: 70
End: 2020-03-25
Payer: COMMERCIAL

## 2020-03-25 DIAGNOSIS — Z98.890 S/P ROTATOR CUFF REPAIR: Primary | ICD-10-CM

## 2020-03-25 PROCEDURE — 97110 THERAPEUTIC EXERCISES: CPT | Mod: GP | Performed by: PHYSICAL THERAPIST

## 2020-03-25 PROCEDURE — 97112 NEUROMUSCULAR REEDUCATION: CPT | Mod: GP | Performed by: PHYSICAL THERAPIST

## 2020-03-25 NOTE — PROGRESS NOTES
Subjective:  HPI  Physical Exam                    Objective:  System    Physical Exam    General     ROS    Assessment/Plan:    Discharge Report     12/13/2019-3/25/2020, total PT Intervention  6 visits.         SUBJECTIVE   5 -6 months post op RCR, biceps tenodesis,  SAD.     Secondary pain of hip and knees  Has been abolished over the past 3 days as well without the use of a SEC     3/100 SPADI       Current Pain level: 1/10   Initial Pain level: 3/10   Changes in function: No changes noted in function since last SOAP note   Adverse reactions: None;   ,         OBJECTIVE   170, ER 70-80, IR 90.     No painful arc.     Scap position/control is good.   Posterior shoulder reach to T7 bilaterally.     Final visit today for establishing self car guidelines and exercises has been complete. DC PT as planned. Follow up prn for R hip pain if not 100% resolved within 2-3 weeks.       ASSESSMENT/PLAN  Updated problem list and treatment plan: Diagnosis 1:  R shoulder post op RCR    Diagnosis 2:  R Hip Pain     STG/LTGs have been met or progress has been made towards goals:  Yes (See Goal flow sheet completed today.)  Assessment of Progress: The patient's condition is improving.  Self Management Plans:  Patient has been instructed in a home treatment program.  I have re-evaluated this patient and find that the nature, scope, duration and intensity of the therapy is appropriate for the medical condition of the patient.  Felicitas continues to require the following intervention to meet STG and LTG's: PT  We will discharge this patient from PT.    Recommendations:  This patient is ready to be discharged from therapy and continue their home treatment program.  This patient would benefit from further evaluation as directed by Dr. Hernandez.     Please refer to the daily flowsheet for treatment today, total treatment time and time spent performing 1:1 timed codes.

## 2020-03-25 NOTE — LETTER
[facility]  [address]  [place] [address]  [phone]    2020    Re: Felicitas Avalos   :   1950  MRN:  [id number]   REFERRING PHYSICIAN:   [facility] [facility]    Date of Initial Evaluation:  19  Visits:  Rxs Used: 6  Reason for Referral:  S/P rotator cuff repair    Discharge Report     2019-3/25/2020, total PT Intervention  6 visits.     SUBJECTIVE   5 -6 months post op RCR, biceps tenodesis,  SAD.   Secondary pain of hip and knees  Has been abolished over the past 3 days as well without the use of a SEC     3/100 SPADI     Current Pain level: 1/10   Initial Pain level: 3/10   Changes in function: No changes noted in function since last SOAP note   Adverse reactions: None    OBJECTIVE   170, ER 70-80, IR 90.   No painful arc.   Scap position/control is good.   Posterior shoulder reach to T7 bilaterally.   Final visit today for establishing self car guidelines and exercises has been complete. DC PT as planned. Follow up prn for R hip pain if not 100% resolved within 2-3 weeks.       ASSESSMENT/PLAN  Updated problem list and treatment plan: Diagnosis 1:  R shoulder post op RCR    Diagnosis 2:  R Hip Pain     STG/LTGs have been met or progress has been made towards goals:  Yes (See Goal flow sheet completed today.)  Assessment of Progress: The patient's condition is improving.  Self Management Plans:  Patient has been instructed in a home treatment program.  I have re-evaluated this patient and find that the nature, scope, duration and intensity of the therapy is appropriate for the medical condition of the patient.  Felicitas continues to require the following intervention to meet STG and LTG's: PT  We will discharge this patient from PT.    Recommendations:  This patient is ready to be discharged from therapy and continue their home treatment program.  This patient would benefit from further evaluation as directed by Dr. Hernandez.     Thank you for your  referral.    INQUIRIES  Therapist: Kalyan Phillips, PT, ATC, Cert. MDT  [facility]  [address]  [place] [address]  Phone: [phone]  Fax: [phone]

## 2020-04-23 ENCOUNTER — MYC MEDICAL ADVICE (OUTPATIENT)
Dept: PHYSICAL THERAPY | Facility: CLINIC | Age: 70
End: 2020-04-23

## 2020-04-28 ENCOUNTER — VIRTUAL VISIT (OUTPATIENT)
Dept: PHYSICAL THERAPY | Facility: CLINIC | Age: 70
End: 2020-04-28
Payer: COMMERCIAL

## 2020-04-28 DIAGNOSIS — M25.511 ACUTE PAIN OF RIGHT SHOULDER: Primary | ICD-10-CM

## 2020-04-28 DIAGNOSIS — Z98.890 S/P ROTATOR CUFF REPAIR: ICD-10-CM

## 2020-04-28 DIAGNOSIS — M53.3 SI (SACROILIAC) JOINT DYSFUNCTION: ICD-10-CM

## 2020-04-28 PROCEDURE — 97110 THERAPEUTIC EXERCISES: CPT | Mod: 95 | Performed by: PHYSICAL THERAPIST

## 2020-04-28 PROCEDURE — 97112 NEUROMUSCULAR REEDUCATION: CPT | Mod: 95 | Performed by: PHYSICAL THERAPIST

## 2020-05-02 ENCOUNTER — MYC MEDICAL ADVICE (OUTPATIENT)
Dept: PHYSICAL THERAPY | Facility: CLINIC | Age: 70
End: 2020-05-02

## 2020-05-04 NOTE — PROGRESS NOTES
"PROGRESS  REPORT    Physical Therapy Virtual Follow Up Visit      Progress reporting period is from 3/25/2020 to 4/28/2020.       The patient has been notified of following:     \"This virtual visit will be conducted between you and your provider. We have found that certain health care needs can be provided without the need for physical presence.  This service lets us provide the care you need with a virtual visit.\"    Due to external, as well as internal [facility] management of the COVID-19 Virus, Felicitas Avalos was not seen in our clinic.  As a substitution, we implemented a virtual visit to manage this patient's condition utilizing the [employer] virtual visit platform via the patient s existing code.  The provider, Leonid Phillips, reviewed the patient's chart, PTRx prescription, and spoke with the patient to determine the following telemedicine visit is appropriate and effective for the patient's care.    The following type of visit was completed:   Video Visit:  The [employer] platform uses a synchronous HIPAA compliant video stream for this patient encounter.      SUBJECTIVE  Subjective changes noted by patient:  Felicitas Avalos is a 69 year old female. Connected virtually on the PTRx platform to discuss their condition/progress. They noted improvements in R shoulder care .      They noted ongoing pain or limitations with low back, SIJ .      Subjective: Felicitas is roughly 6 months post op R RCR. She is gradually improving with shoulder pain, ROM and function.     She has most complaints of SIJ area pain that she is referred for and instructed to an SIJ belt as well as PT exercises to help with self intervention.     See MD note at most recent visit.     Patient notes that she is always worse pain with standing and walking, especially negotiating stairs.     Point tenderness directly to R SIJ area.     Current pain level is 4/10  .     Previous pain level was  4/10 Initial Pain level: 3/10.   Changes in function:  Yes " (See Goal flowsheet attached for changes in current functional level)  Adverse reaction to treatment or activity: None    OBJECTIVE  Changes noted in objective findings:    Objective: Upon [place] examination as well as consult with MD.     Patient had been instructed to self directed exercises as directed. See PTrx for HEP.     She will follow up in 2 weeks with consult. Possible brace at that point if she is no further along with PT. As far as her shoulder , she is continued improvement with SPADI 2/100. Carry on with current intervention for the shoulder      System  Physical Exam  General   ROS    PTRx Content from today's visit:  Exercise Name: Bridging Pelvic Floor , Sets: 1 - Reps: 10, 5 sec  - Sessions: 1  Exercise Name: Flexion in Sitting with Patient Overpressure/Progression to Knee Extension, Sets: 1 - Reps: 5-10  - Sessions: 1  Exercise Name: Pubic Shot Gun MET, Sets: 1 - Reps: 10 - Sessions: 1  Exercise Name: Supine MET For Anterior Posterior Innominate Rotation, Sets: 1 - Reps: 5-10 - Sessions: 1    ASSESSMENT/PLAN  Updated problem list and treatment plan: Diagnosis 1:  R shoulder RCR     Diagnosis 2:  R SIJ Dysfunction    Pain -  hot/cold therapy, self management, education, directional preference exercise and home program  Decreased ROM/flexibility - manual therapy and therapeutic exercise  STG/LTGs have been met or progress has been made towards goals:  Yes (See Goal flow sheet completed today.)  Assessment of Progress: The patient's condition is improving.  The patient's condition has potential to improve.  Self Management Plans:  Patient has been instructed in a home treatment program.        Recommendations:  Patient will continue with the exercise program assigned on their PTRx code.  This patient would benefit from continued therapy.     Frequency:  1 X week, every other week  once daily  Duration:  for 8 weeks          Virtual visit contact time    Time of service began: 9:20 AM  Time of service  ended: 10:00  AM  Total Time for set up, visit, and documentation: 10 minutes    Payor: SUKHDEEP / Plan: [facility] MEDICARE / Product Type: HMO /     Procedure Code/s   Therapeutic Exercise (04937): 20 minutes  Neuromuscular Re-education (26644): 12 minutes    I have reviewed the note as documented above.  This accurately captures the substance of my conversation with the patient.  Provider location: [place]  ([facility]State)  Patient location: [place]

## 2020-05-12 ENCOUNTER — VIRTUAL VISIT (OUTPATIENT)
Dept: PHYSICAL THERAPY | Facility: CLINIC | Age: 70
End: 2020-05-12
Payer: COMMERCIAL

## 2020-05-12 DIAGNOSIS — Z98.890 S/P ROTATOR CUFF SURGERY: ICD-10-CM

## 2020-05-12 DIAGNOSIS — M53.3 SACRO-ILIAC PAIN: Primary | ICD-10-CM

## 2020-05-12 PROCEDURE — 97110 THERAPEUTIC EXERCISES: CPT | Mod: 95 | Performed by: PHYSICAL THERAPIST

## 2020-05-12 PROCEDURE — 97112 NEUROMUSCULAR REEDUCATION: CPT | Mod: 95 | Performed by: PHYSICAL THERAPIST

## 2020-05-12 NOTE — PROGRESS NOTES
"Physical Therapy Virtual Follow Up Visit      The patient has been notified of following:     \"This virtual visit will be conducted between you and your provider. We have found that certain health care needs can be provided without the need for physical presence.  This service lets us provide the care you need with a virtual visit.\"    Due to external, as well as internal [facility] management of the COVID-19 Virus, Felicitas Avalos was not seen in our clinic.  As a substitution, we implemented a virtual visit to manage this patient's condition utilizing the PTRx virtual visit platform via the patient s existing code.  The provider, Leonid Phillips, reviewed the patient's chart, PTRx prescription, and spoke with the patient to determine the following telemedicine visit is appropriate and effective for the patient's care.    The following type of visit was completed:   Video Visit:  The PTRx platform uses a synchronous HIPAA compliant video stream for this patient encounter.        S: Felicitas Avalos is a 70 year old female. Connected virtually on the PTRx platform to discuss their condition/progress. They noted improvements in ROM and overall pain .  They noted ongoing pain or limitations with stair reciprocation and golfing, prolong walking greater than 10 minutes .     Current pain level: 1/10      O: Patient demonstrated end range extension hurts as well as heavy lifting from a bent over position.     Recommend to increase resistance and consider backing off to every other day with SIJ exercises .     Stay the course with shoulder strength trading exercise day with low back and shoulder      PTRx Content from today's visit:  Exercise Name: Flexion in Sitting with Patient Overpressure/Progression to Knee Extension, Sets: 1 - Reps: 10 ... hold up to 10 seconds  - Sessions: 1  Exercise Name: Supine Lumbar Hip Roll, Sets: 1 - Reps: 10 reps, hold up to 10 seconds   Exercise Name: Bridging Pelvic Floor , Sets: 1 - " Reps: 10, 5 sec  - Sessions: 1  Exercise Name: Pubic Shot Gun MET, Sets: 1 - Reps: 10, hold 5 seconds  - Sessions: 1  Exercise Name: birddog, Sets: 1 - Reps: 10 reps each leg/arm  - Sessions: 1  Exercise Name: Supine MET For Anterior Posterior Innominate Rotation, Sets: 1 - Reps: 10 - Sessions: 1    A:   Patient's symptoms are resolving.  Patient is progressing as expected.  Response to therapy has shown an improvement in  pain level, ROM  and strength      P: Patient will continue with the exercise program assigned on their PTRx code and will add the following measures to manage their pain/condition:      Current treatment program is being advanced to more complex exercises.  Continue current treatment plan until patient demonstrates readiness to progress to higher level exercises.      Virtual visit contact time    Time of service began: 9:20 AM  Time of service ended: 10:00 AM  Total Time for set up, visit, and documentation: 10 minutes    Payor: [facility] / Plan: [facility] MEDICARE / Product Type: HMO /   .  Procedure Code/s   Therapeutic Exercise (86590): 18 minutes  Neuromuscular Re-education (40414): 12 minutes    I have reviewed the note as documented above.  This accurately captures the substance of my conversation with the patient.  Provider location: [place]  ([facility])  Patient location: [place], [place]

## 2020-05-29 ENCOUNTER — VIRTUAL VISIT (OUTPATIENT)
Dept: PHYSICAL THERAPY | Facility: CLINIC | Age: 70
End: 2020-05-29
Payer: COMMERCIAL

## 2020-05-29 DIAGNOSIS — M53.3 SI (SACROILIAC) PAIN: Primary | ICD-10-CM

## 2020-05-29 PROCEDURE — 97110 THERAPEUTIC EXERCISES: CPT | Mod: 95 | Performed by: PHYSICAL THERAPIST

## 2020-05-29 NOTE — PROGRESS NOTES
"PROGRESS  REPORT    Physical Therapy Virtual Follow Up Visit      Progress reporting period is from 4/28/2020 to 5/28/2020.       The patient has been notified of following:     \"This virtual visit will be conducted between you and your provider. We have found that certain health care needs can be provided without the need for physical presence.  This service lets us provide the care you need with a virtual visit.\"    Due to external, as well as internal [facility] management of the COVID-19 Virus, Felicitas Avalos was not seen in our clinic.  As a substitution, we implemented a virtual visit to manage this patient's condition utilizing the PTRx virtual visit platform via the patient s existing code.  The provider, Leonid Phillips, reviewed the patient's chart, PTRx prescription, and spoke with the patient to determine the following telemedicine visit is appropriate and effective for the patient's care.    The following type of visit was completed:   Video Visit:  The [employer] platform uses a synchronous HIPAA compliant video stream for this patient encounter.      SUBJECTIVE  Subjective changes noted by patient:  Felicitas Avalos is a 70 year old female. Connected virtually on the PTRx platform to discuss their condition/progress.     They noted improvements in night pain .     They noted ongoing pain or limitations with gait, function.      Subjective: Pateint is painfree at rest, however, remains llimited with abilit yto lift, exercise and play golf,. She is sleeping better at night now that she is increased overall intensity with exercises        Current pain level is 1/10 Current Pain level: 3/10.     Previous pain level was  5/10 Initial Pain level: 3/10.   Changes in function:    Adverse reaction to treatment or activity: None    OBJECTIVE  Changes noted in objective findings:  None  Objective: Palpation soreness directly R SIJ , worse with extension, walking, lifting and attempting to play golf. She walks " with a limp at times, especially after sit to stand. It is recommended that the patient follow up with her MD for consult. Possibly guided injection. Felicitas will see her MD next week for consult. No future PT appointments made at this time. Possible benefit from Manual mobilization therapist if she is failing to respond to exercise and injection     PTRx Content from today's visit:  Exercise Name: Pretzel Stretch, Sets: 1 - Reps: 3, 30 sec hold   Exercise Name: Supine Lumbar Hip Roll, Sets: 1 - Reps: 2-3, 30 sec hold  - Sessions: 1  Exercise Name: Hip Abduction Straight Leg Raise, Sets: 1-2 - Reps: 30 - Sessions: 1  Exercise Name: Hip Adduction Straight Leg Raise, Sets: 1-2 - Reps: 30 - Sessions: 1  Exercise Name: Pubic Shot Gun MET, Sets: 2 - Reps: 10, hold 5 seconds  - Sessions: 1  Exercise Name: birddog, Sets: 2 - Reps: 10 reps each leg/arm  - Sessions: 1, Notes:    Exercise Name: Supine MET For Anterior Posterior Innominate Rotation, Sets: 2 - Reps: 10-20 each side  - Sessions: 1    ASSESSMENT/PLAN  Updated problem list and treatment plan: Diagnosis 1:  R SIJ Pain       STG/LTGs have been met or progress has been made towards goals:  None  Assessment of Progress: The patient's condition has potential to improve.  The patient's condition is unchanged.  Self Management Plans:  Patient has been instructed in a home treatment program.    Recommendations:  Patient will continue with the exercise program assigned on their PTRx code.  This patient would benefit from further evaluation.      Virtual visit contact time    Time of service began: 10:30 AM  Time of service ended: 11:20 AM  Total Time for set up, visit, and documentation: 45 minutes    Payor: [facility] / Plan: [facility] MEDICARE / Product Type: HMO /     Procedure Code/s   Therapeutic Exercise (01957): 30 minutes    Also 10-15 minutes of consult to anatomy, purposing exercises and plan of care     I have reviewed the note as documented above.  This accurately captures  the substance of my conversation with the patient.  Provider location: FLORI Álvarez  ([facility])  Patient location: Home

## 2020-06-02 ENCOUNTER — TRANSFERRED RECORDS (OUTPATIENT)
Dept: HEALTH INFORMATION MANAGEMENT | Facility: CLINIC | Age: 70
End: 2020-06-02

## 2020-06-08 ENCOUNTER — HOSPITAL ENCOUNTER (OUTPATIENT)
Dept: MRI IMAGING | Facility: CLINIC | Age: 70
Discharge: HOME | End: 2020-06-08
Attending: PHYSICIAN ASSISTANT | Admitting: PHYSICIAN ASSISTANT
Payer: COMMERCIAL

## 2020-06-08 DIAGNOSIS — M54.16 RADICULOPATHY, LUMBAR REGION: ICD-10-CM

## 2020-06-08 DIAGNOSIS — M48.062 SPINAL STENOSIS OF LUMBAR REGION WITH NEUROGENIC CLAUDICATION: ICD-10-CM

## 2020-06-08 PROCEDURE — 72148 MRI LUMBAR SPINE W/O DYE: CPT

## 2020-09-29 ENCOUNTER — IMMUNIZATION (OUTPATIENT)
Dept: FAMILY MEDICINE | Facility: CLINIC | Age: 70
End: 2020-09-29
Payer: COMMERCIAL

## 2020-09-29 DIAGNOSIS — Z23 NEED FOR PROPHYLACTIC VACCINATION AND INOCULATION AGAINST INFLUENZA: Primary | ICD-10-CM

## 2020-09-29 PROCEDURE — 99207 ZZC NO CHARGE NURSE ONLY: CPT

## 2020-09-29 PROCEDURE — G0008 ADMIN INFLUENZA VIRUS VAC: HCPCS

## 2020-09-29 PROCEDURE — 90662 IIV NO PRSV INCREASED AG IM: CPT

## 2021-03-01 DIAGNOSIS — E78.00 PURE HYPERCHOLESTEROLEMIA: ICD-10-CM

## 2021-03-01 RX ORDER — SIMVASTATIN 40 MG
TABLET ORAL
Qty: 90 TABLET | Refills: 3 | OUTPATIENT
Start: 2021-03-01

## 2021-03-01 NOTE — TELEPHONE ENCOUNTER
"Requested Prescriptions   Pending Prescriptions Disp Refills     simvastatin (ZOCOR) 40 MG tablet [Pharmacy Med Name: SIMVASTATIN 40MG TABS] 90 tablet 3     Sig: TAKE ONE TABLET BY MOUTH AT BEDTIME       Statins Protocol Failed - 3/1/2021 10:03 AM        Failed - LDL on file in past 12 months     Recent Labs   Lab Test 12/31/19  0857   *             Passed - No abnormal creatine kinase in past 12 months     No lab results found.             Passed - Recent (12 mo) or future (30 days) visit within the authorizing provider's specialty     Patient has had an office visit with the authorizing provider or a provider within the authorizing providers department within the previous 12 mos or has a future within next 30 days. See \"Patient Info\" tab in inbasket, or \"Choose Columns\" in Meds & Orders section of the refill encounter.              Passed - Medication is active on med list        Passed - Patient is age 18 or older        Passed - No active pregnancy on record        Passed - No positive pregnancy test in past 12 months             "

## 2021-03-05 ENCOUNTER — MYC MEDICAL ADVICE (OUTPATIENT)
Dept: FAMILY MEDICINE | Facility: CLINIC | Age: 71
End: 2021-03-05

## 2021-03-09 ENCOUNTER — IMMUNIZATION (OUTPATIENT)
Dept: NURSING | Facility: CLINIC | Age: 71
End: 2021-03-09
Payer: COMMERCIAL

## 2021-03-09 PROCEDURE — 91303 PR COVID VAC JANSSEN AD26 0.5ML: CPT

## 2021-03-09 PROCEDURE — 0031A PR COVID VAC JANSSEN AD26 0.5ML: CPT

## 2021-03-11 NOTE — PROGRESS NOTES
"SUBJECTIVE:   Felicitas Avalos is a 70 year old female who presents for Preventive Visit.      Patient has been advised of split billing requirements and indicates understanding: Yes  Are you in the first 12 months of your Medicare Part B coverage?  No    Physical Health:    In general, how would you rate your overall physical health? excellent    Outside of work, how many days during the week do you exercise? 6-7 days/week    Outside of work, approximately how many minutes a day do you exercise?45-60 minutes    If you drink alcohol do you typically have >3 drinks per day or >7 drinks per week? No    Do you usually eat at least 4 servings of fruit and vegetables a day, include whole grains & fiber and avoid regularly eating high fat or \"junk\" foods? Yes    Do you have any problems taking medications regularly?  No    Do you have any side effects from medications? none    Needs assistance for the following daily activities: no assistance needed    Which of the following safety concerns are present in your home?  none identified     Hearing impairment: No    In the past 6 months, have you been bothered by leaking of urine? no    Mental Health:    In general, how would you rate your overall mental or emotional health? excellent  PHQ-2 Score:    PHQ-2 Score:     PHQ-2 ( 1999 Pfizer) 3/16/2021 2/25/2020   Q1: Little interest or pleasure in doing things 0 0   Q2: Feeling down, depressed or hopeless 0 0   PHQ-2 Score 0 0   Q1: Little interest or pleasure in doing things - Not at all   Q2: Feeling down, depressed or hopeless - Not at all   PHQ-2 Score - 0       Do you feel safe in your environment? Yes    Have you ever done Advance Care Planning? (For example, a Health Directive, POLST, or a discussion with a medical provider or your loved ones about your wishes): Yes, advance care planning is on file.    Additional concerns to address?  No    Fall risk:  Fallen 2 or more times in the past year?: Yes  Any fall with injury in " the past year?: Yes  Timed Up and Go Test (>13.5 is fall risk; contact physician) : 5    Cognitive Screenin) Repeat 3 items (Leader, Season, Table)    2) Clock draw: NORMAL  3) 3 item recall: Recalls 3 objects  Results: 3 items recalled: COGNITIVE IMPAIRMENT LESS LIKELY    Mini-CogTM Copyright GABRIEL Rivera. Licensed by the author for use in [facility]; reprinted with permission ([email or web address]). All rights reserved.      Do you have sleep apnea, excessive snoring or daytime drowsiness?: no        Hyperlipidemia Follow-Up      Are you regularly taking any medication or supplement to lower your cholesterol?   Yes- .    Are you having muscle aches or other side effects that you think could be caused by your cholesterol lowering medication?  Yes- In feet       Reviewed and updated as needed this visit by clinical staff  Tobacco  Allergies  Meds  Problems  Med Hx  Surg Hx  Fam Hx  Soc Hx          Reviewed and updated as needed this visit by Provider   Allergies  Meds  Problems            Social History     Tobacco Use     Smoking status: Never Smoker     Smokeless tobacco: Never Used   Substance Use Topics     Alcohol use: Yes     Comment: On rare occasions                           Current providers sharing in care for this patient include:   Patient Care Team:  Ana Bello APRN CNP as PCP - General (Nurse Practitioner)  Ana Bello APRN CNP as Assigned PCP  Ana Bello APRN CNP as Referring Physician (Nurse Practitioner)  Aliza Pace PA as Physician Assistant (Physician Assistant)    The following health maintenance items are reviewed in Epic and correct as of today:  Health Maintenance   Topic Date Due     FALL RISK ASSESSMENT  2021     COVID-19 Vaccine (COVID-19,PF,Valeriano) Refer to guidelines     COLORECTAL CANCER SCREENING  2022     MAMMO SCREENING  2022     MEDICARE ANNUAL WELLNESS VISIT  2022     DTAP/TDAP/TD IMMUNIZATION (4 - Td)  "01/29/2026     LIPID  03/16/2026     ADVANCE CARE PLANNING  03/16/2026     DEXA  02/22/2033     HEPATITIS C SCREENING  Completed     PHQ-2  Completed     INFLUENZA VACCINE  Completed     Pneumococcal Vaccine: 65+ Years  Completed     ZOSTER IMMUNIZATION  Completed     Pneumococcal Vaccine: Pediatrics (0 to 5 Years) and At-Risk Patients (6 to 64 Years)  Aged Out     IPV IMMUNIZATION  Aged Out     MENINGITIS IMMUNIZATION  Aged Out     HEPATITIS B IMMUNIZATION  Aged Out           ROS:  Constitutional, HEENT, cardiovascular, pulmonary, GI, , musculoskeletal, neuro, skin, endocrine and psych systems are negative, except as otherwise noted.    OBJECTIVE:   /64   Pulse 63   Temp 98  F (36.7  C) (Tympanic)   Resp 12   Ht 1.594 m (5' 2.75\")   Wt 65.7 kg (144 lb 12.8 oz)   SpO2 99%   BMI 25.85 kg/m   Estimated body mass index is 25.85 kg/m  as calculated from the following:    Height as of this encounter: 1.594 m (5' 2.75\").    Weight as of this encounter: 65.7 kg (144 lb 12.8 oz).  EXAM:   GENERAL APPEARANCE: healthy, alert and no distress  EYES: Eyes grossly normal to inspection, PERRL and conjunctivae and sclerae normal  HENT: ear canals and TM's normal, nose and mouth without ulcers or lesions, oropharynx clear and oral mucous membranes moist  NECK: no adenopathy, no asymmetry, masses, or scars and thyroid normal to palpation  RESP: lungs clear to auscultation - no rales, rhonchi or wheezes  BREAST: normal without masses, tenderness or nipple discharge and no palpable axillary masses or adenopathy  CV: regular rate and rhythm, normal S1 S2, no S3 or S4, no murmur, click or rub, no peripheral edema and peripheral pulses strong  ABDOMEN: soft, nontender, no hepatosplenomegaly, no masses and bowel sounds normal  MS: no musculoskeletal defects are noted and gait is age appropriate without ataxia  SKIN: no suspicious lesions or rashes  NEURO: Normal strength and tone, sensory exam grossly normal, mentation " "intact and speech normal  PSYCH: mentation appears normal and affect normal/bright        ASSESSMENT / PLAN:       ICD-10-CM    1. Encounter for Medicare annual wellness exam    Z00.00    2. PURE HYPERCHOLESTEROLEM  E78.00 Due for labs today.  Continue simvastatin (ZOCOR) 40 MG tablet     Lipid panel reflex to direct LDL Fasting     OFFICE/OUTPT VISIT,EST,LEVL III     3. Visit for screening mammogram  Z12.31 MA Screening Digital Bilateral       Patient has been advised of split billing requirements and indicates understanding: Yes by AFR and CMA    COUNSELING:  Reviewed preventive health counseling, as reflected in patient instructions    Estimated body mass index is 25.85 kg/m  as calculated from the following:    Height as of this encounter: 1.594 m (5' 2.75\").    Weight as of this encounter: 65.7 kg (144 lb 12.8 oz).      She reports that she has never smoked. She has never used smokeless tobacco.    Appropriate preventive services were discussed with this patient, including applicable screening as appropriate for cardiovascular disease, diabetes, osteopenia/osteoporosis, and glaucoma.  As appropriate for age/gender, discussed screening for colorectal cancer, prostate cancer, breast cancer, and cervical cancer. Checklist reviewing preventive services available has been given to the patient.    Reviewed patients plan of care and provided an AVS. The Basic Care Plan (routine screening as documented in Health Maintenance) for Felicitas meets the Care Plan requirement. This Care Plan has been established and reviewed with the Patient.      The risks, benefits and treatment options of prescribed medications or other treatments have been discussed with the patient. The patient verbalized their understanding and should call or follow up if no improvement or if they develop further problems.    ANGELITO [facility]  "

## 2021-03-11 NOTE — PATIENT INSTRUCTIONS
Preventive Health Recommendations    See your health care provider every year to    Review health changes.     Discuss preventive care.      Review your medicines if your doctor has prescribed any.      You no longer need a yearly Pap test unless you've had an abnormal Pap test in the past 10 years. If you have vaginal symptoms, such as bleeding or discharge, be sure to talk with your provider about a Pap test.      Every 1 to 2 years, have a mammogram.  If you are over 69, talk with your health care provider about whether or not you want to continue having screening mammograms.      Every 10 years, have a colonoscopy. Or, have a yearly FIT test (stool test). These exams will check for colon cancer.       Have a cholesterol test every 5 years, or more often if your doctor advises it.       Have a diabetes test (fasting glucose) every three years. If you are at risk for diabetes, you should have this test more often.       At age 65, have a bone density scan (DEXA) to check for osteoporosis (brittle bone disease).    Shots:    Get a flu shot each year.    Get a tetanus shot every 10 years.    Talk to your doctor about your pneumonia vaccines. There are now two you should receive - Pneumovax (PPSV 23) and Prevnar (PCV 13).    Talk to your pharmacist about the shingles vaccine.    Talk to your doctor about the hepatitis B vaccine.    Nutrition:     Eat at least 5 servings of fruits and vegetables each day.      Eat whole-grain bread, whole-wheat pasta and brown rice instead of white grains and rice.      Get adequate about Calcium and Vitamin D.     Lifestyle    Exercise at least 150 minutes a week (30 minutes a day, 5 days a week). This will help you control your weight and prevent disease.      Limit alcohol to one drink per day.      No smoking.       Wear sunscreen to prevent skin cancer.       See your dentist twice a year for an exam and cleaning.      See your eye doctor every 1 to 2 years to screen for  conditions such as glaucoma, macular degeneration, cataracts, etc.    Personalized Prevention Plan  You are due for the preventive services outlined below.  Your care team is available to assist you in scheduling these services.  If you have already completed any of these items, please share that information with your care team to update in your medical record.    Health Maintenance Due   Topic Date Due     PHQ-2  01/01/2021     FALL RISK ASSESSMENT  03/02/2021     Annual Wellness Visit  03/02/2021     Patient Education   Personalized Prevention Plan  You are due for the preventive services outlined below.  Your care team is available to assist you in scheduling these services.  If you have already completed any of these items, please share that information with your care team to update in your medical record.  Health Maintenance Due   Topic Date Due     FALL RISK ASSESSMENT  03/02/2021       Preventing Falls at Home  A person can fall for many reasons. Older adults may fall because reaction time slows down as we age. Your muscles and joints may get stiff, weak, or less flexible because of illness, medicines, or a physical condition.   Other health problems that make falls more likely include:     Arthritis    Dizziness or lightheadedness when you stand up (orthostatic hypotension)    History of a stroke    Dizziness    Anemia    Certain medicines taken for mental illness or to control blood pressure.    Problems with balance or gait    Bladder or urinary problems    History of falling    Changes in vision (vision impairment)    Changes in thinking skills and memory (cognitive impairment)  Injuries from a fall can include serious injuries such as broken bones, dislocated joints, internal bleeding and cuts. Injuries like these can limit your independence.   Prevention tips  To help prevent falls and fall-related injuries, follow the tips below.    Floors  To make floors safer:     Put nonskid pads under area  rugs.    Remove small rugs.    Replace worn floor coverings.    Tack carpets firmly to each step on carpeted stairs. Put nonskid strips on the edges of uncarpeted stairs.    Keep floors and stairs free of clutter and cords.    Arrange furniture so there are clear pathways.    Clean up any spills right away.  Bathrooms    To make bathrooms safer:     Install grab bars in the tub or shower.    Apply nonskid strips or put a nonskid rubber mat in the tub or shower.    Sit on a bath chair to bathe.    Use bathmats with nonskid backing.  Lighting  To improve visibility in your home:      Keep a flashlight in each room. Or put a lamp next to the bed within easy reach.    Put nightlights in the bedrooms, hallways, kitchen, and bathrooms.    Make sure all stairways have good lighting.    Take your time when going up and down stairs.    Put handrails on both sides of stairs and in walkways for more support. To prevent injury to your wrist or arm, don t use handrails to pull yourself up.    Install grab bars to pull yourself up.    Move or rearrange items that you use often. This will make them easier to find or reach.    Look at your home to find any safety hazards. Especially look at doorways, walkways, and the driveway. Remove or repair any safety problems that you find.  Other changes to make    Look around to find any safety hazards. Look closely at doorways, walkways, and the driveway. Remove or repair any safety problems that you find.    Wear shoes that fit well.    Take your time when going up and down stairs.    Put handrails on both sides of stairs and in walkways for more support. To prevent injury to your wrist or arm, don t use handrails to pull yourself up.    Install grab bars wherever needed to pull yourself up.    Arrange items that you use often. This will make them easier to find or reach.    Pamella last reviewed this educational content on 3/1/2020    3924-3808 [employer]. All rights  reserved. This information is not intended as a substitute for professional medical care. Always follow your healthcare professional's instructions.

## 2021-03-16 ENCOUNTER — OFFICE VISIT (OUTPATIENT)
Dept: FAMILY MEDICINE | Facility: CLINIC | Age: 71
End: 2021-03-16
Payer: COMMERCIAL

## 2021-03-16 ENCOUNTER — HOSPITAL ENCOUNTER (OUTPATIENT)
Dept: BONE DENSITY | Facility: CLINIC | Age: 71
Discharge: HOME | End: 2021-03-16
Attending: NURSE PRACTITIONER | Admitting: NURSE PRACTITIONER
Payer: COMMERCIAL

## 2021-03-16 VITALS
HEART RATE: 63 BPM | OXYGEN SATURATION: 99 % | BODY MASS INDEX: 25.66 KG/M2 | SYSTOLIC BLOOD PRESSURE: 114 MMHG | RESPIRATION RATE: 12 BRPM | DIASTOLIC BLOOD PRESSURE: 64 MMHG | HEIGHT: 63 IN | WEIGHT: 144.8 LBS | TEMPERATURE: 98 F

## 2021-03-16 DIAGNOSIS — Z00.00 ENCOUNTER FOR MEDICARE ANNUAL WELLNESS EXAM: Primary | ICD-10-CM

## 2021-03-16 DIAGNOSIS — E78.00 PURE HYPERCHOLESTEROLEMIA: ICD-10-CM

## 2021-03-16 DIAGNOSIS — Z12.31 VISIT FOR SCREENING MAMMOGRAM: ICD-10-CM

## 2021-03-16 DIAGNOSIS — Z78.0 POSTMENOPAUSAL STATUS: ICD-10-CM

## 2021-03-16 LAB
CHOLEST SERPL-MCNC: 270 MG/DL
HDLC SERPL-MCNC: 67 MG/DL
LDLC SERPL CALC-MCNC: 179 MG/DL
NONHDLC SERPL-MCNC: 203 MG/DL
TRIGL SERPL-MCNC: 119 MG/DL

## 2021-03-16 PROCEDURE — 80061 LIPID PANEL: CPT | Performed by: NURSE PRACTITIONER

## 2021-03-16 PROCEDURE — 36415 COLL VENOUS BLD VENIPUNCTURE: CPT | Performed by: NURSE PRACTITIONER

## 2021-03-16 PROCEDURE — 99213 OFFICE O/P EST LOW 20 MIN: CPT | Mod: 25 | Performed by: NURSE PRACTITIONER

## 2021-03-16 PROCEDURE — 77080 DXA BONE DENSITY AXIAL: CPT

## 2021-03-16 PROCEDURE — 99397 PER PM REEVAL EST PAT 65+ YR: CPT | Performed by: NURSE PRACTITIONER

## 2021-03-16 RX ORDER — SIMVASTATIN 40 MG
TABLET ORAL
Qty: 90 TABLET | Refills: 3 | Status: SHIPPED | OUTPATIENT
Start: 2021-03-16 | End: 2022-01-24

## 2021-03-16 ASSESSMENT — MIFFLIN-ST. JEOR: SCORE: 1151.97

## 2021-03-16 NOTE — NURSING NOTE
"Initial /64   Pulse 63   Temp 98  F (36.7  C) (Tympanic)   Resp 12   Ht 1.594 m (5' 2.75\")   Wt 65.7 kg (144 lb 12.8 oz)   SpO2 99%   BMI 25.85 kg/m   Estimated body mass index is 25.85 kg/m  as calculated from the following:    Height as of this encounter: 1.594 m (5' 2.75\").    Weight as of this encounter: 65.7 kg (144 lb 12.8 oz). .      "

## 2021-03-24 NOTE — PROGRESS NOTES
Felicitas is a 70 year old who is being evaluated via a billable telephone visit.      What phone number would you like to be contacted at? [phone]  How would you like to obtain your AVS? MyChart    Assessment & Plan     Age-related osteoporosis without current pathological fracture  New diagnosis.  1. Daily calcium supplements. Need 1200 mg elemental calcium.  2. Daily vitamin D supplement. Need 800 IU.  3. Weight bearing exercise.  4. Prescribed alendronate 70 mg per week. Anticipate using this medication for the next 5 years and then stopping. Take in the morning on an empty stomach. Remain upright for 30 minutes after taking this medication.      - alendronate (FOSAMAX) 70 MG tablet; Take 1 tablet (70 mg) by mouth every 7 days                 Return in about 1 year (around 3/25/2022).    The risks, benefits and treatment options of prescribed medications or other treatments have been discussed with the patient. The patient verbalized their understanding and should call or follow up if no improvement or if they develop further problems.    ANGELITO [facility]      Subjective   Felicitas is a 70 year old who presents for the following health issues     HPI     Chief Complaint   Patient presents with     Results     discuss results/ treatment options for osteoporosis       Recent fracture in left radius after a fall.  No other fractures  Has never been treated for osteoporosis before.    Mother has a femur fracture in her 90s.        BONE DENSITY (DEXA)  3/16/2021 9:37 AM     HISTORY: Postmenopausal.     COMPARISON: 2/22/2018     TECHNIQUE: The study was performed using DEXA in the AP lumbar spine  and AP femur.  In accordance with the ISCD (International Society of  Clinical Densitometry), the lowest BMD between the total hip and  femoral neck will be used.      FINDINGS: Using DEXA, the results were reported according to T-score.  The T-score is the standard deviation from the  peak bone mass in a  normal young patient. A T-score of 0 to -1.0 is normal. A T-score of  -1.0 to -2.5 correlates with osteopenia. A T-score of less than -2.5  correlates with osteoporosis.       The L1 T-score is -3. I suspect false elevation at L2 and L4.  Previously the L1 T-score was -2.8. The femoral neck T-scores are -2.1  on the left and -2.5 on the right. Previously these were -1.6 and  -2.1, respectively.                                                                      IMPRESSION:  1. Mild-moderate osteoporosis within L1, slightly worse.  2. Prominent osteopenia in the left femoral neck, mildly worse.  3. The right femoral neck bone density is borderline between  osteoporosis and osteopenia. This is mildly worse.     DIANA MARINO MD      Osteoporosis Risk Score/FRAX score  [email or web address]   Risk of Hip Fracture: 5.4 (Significant if >3%)   Risk of Overall Fracture: 23 (Significant if >20%)             Review of Systems   Constitutional, HEENT, cardiovascular, pulmonary, gi and gu systems are negative, except as otherwise noted.      Objective         Vitals:  No vitals were obtained today due to virtual visit.    Physical Exam   PSYCH: Alert and oriented times 3; coherent speech, normal   rate and volume, able to articulate logical thoughts, able   to abstract reason, no tangential thoughts, no hallucinations   or delusions   RESP: No cough, no audible wheezing, able to talk in full sentences  Remainder of exam unable to be completed due to telephone visits                Phone call duration: 10 minutes

## 2021-03-25 ENCOUNTER — VIRTUAL VISIT (OUTPATIENT)
Dept: FAMILY MEDICINE | Facility: CLINIC | Age: 71
End: 2021-03-25
Payer: COMMERCIAL

## 2021-03-25 DIAGNOSIS — M81.0 AGE-RELATED OSTEOPOROSIS WITHOUT CURRENT PATHOLOGICAL FRACTURE: Primary | ICD-10-CM

## 2021-03-25 PROCEDURE — 99441 PR PHYSICIAN TELEPHONE EVALUATION 5-10 MIN: CPT | Mod: 95 | Performed by: NURSE PRACTITIONER

## 2021-03-25 RX ORDER — ALENDRONATE SODIUM 70 MG/1
70 TABLET ORAL
Qty: 12 TABLET | Refills: 3 | Status: SHIPPED | OUTPATIENT
Start: 2021-03-25 | End: 2021-12-06

## 2021-04-20 ENCOUNTER — HOSPITAL ENCOUNTER (OUTPATIENT)
Dept: MAMMOGRAPHY | Facility: CLINIC | Age: 71
Discharge: HOME | End: 2021-04-20
Attending: NURSE PRACTITIONER | Admitting: NURSE PRACTITIONER
Payer: COMMERCIAL

## 2021-04-20 DIAGNOSIS — Z12.31 VISIT FOR SCREENING MAMMOGRAM: ICD-10-CM

## 2021-04-20 PROCEDURE — 77067 SCR MAMMO BI INCL CAD: CPT

## 2021-09-12 ENCOUNTER — HEALTH MAINTENANCE LETTER (OUTPATIENT)
Age: 71
End: 2021-09-12

## 2021-11-28 DIAGNOSIS — M81.0 AGE-RELATED OSTEOPOROSIS WITHOUT CURRENT PATHOLOGICAL FRACTURE: ICD-10-CM

## 2021-12-01 NOTE — TELEPHONE ENCOUNTER
Routing refill request to provider for review/approval because:  Labs not current:  Creatinine  Kelsey Bustillos RN

## 2021-12-02 ENCOUNTER — MYC MEDICAL ADVICE (OUTPATIENT)
Dept: FAMILY MEDICINE | Facility: CLINIC | Age: 71
End: 2021-12-02
Payer: COMMERCIAL

## 2021-12-02 DIAGNOSIS — M81.0 AGE-RELATED OSTEOPOROSIS WITHOUT CURRENT PATHOLOGICAL FRACTURE: ICD-10-CM

## 2021-12-02 RX ORDER — ALENDRONATE SODIUM 70 MG/1
TABLET ORAL
Qty: 12 TABLET | Refills: 3 | OUTPATIENT
Start: 2021-12-02

## 2021-12-02 NOTE — TELEPHONE ENCOUNTER
This was filled for the year in March 2021.  Therefore she should have enough refills until her annual appointment is due.  Ana Bello, CNP

## 2021-12-06 RX ORDER — ALENDRONATE SODIUM 70 MG/1
70 TABLET ORAL
Qty: 12 TABLET | Refills: 0 | Status: SHIPPED | OUTPATIENT
Start: 2021-12-06

## 2022-01-23 ENCOUNTER — MYC MEDICAL ADVICE (OUTPATIENT)
Dept: FAMILY MEDICINE | Facility: CLINIC | Age: 72
End: 2022-01-23
Payer: COMMERCIAL

## 2022-01-23 DIAGNOSIS — E78.00 PURE HYPERCHOLESTEROLEMIA: ICD-10-CM

## 2022-01-24 RX ORDER — SIMVASTATIN 40 MG
TABLET ORAL
Qty: 90 TABLET | Refills: 0 | Status: SHIPPED | OUTPATIENT
Start: 2022-01-24

## 2022-04-24 ENCOUNTER — HEALTH MAINTENANCE LETTER (OUTPATIENT)
Age: 72
End: 2022-04-24

## 2022-11-19 ENCOUNTER — HEALTH MAINTENANCE LETTER (OUTPATIENT)
Age: 72
End: 2022-11-19

## 2023-07-01 ENCOUNTER — HEALTH MAINTENANCE LETTER (OUTPATIENT)
Age: 73
End: 2023-07-01

## 2023-11-20 NOTE — ED AVS SNAPSHOT
CHIEF COMPLAINT:  Chief Complaint   Patient presents with   • Office Visit     F/u Hair loss - patient reports that she has not seen improvment         SUBJECTIVE:  Patient is 25 year old female  Patient is here today for follow up for hair loss  PT states first  6 months while on vivascal hair loss was not getting worse. 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  PT states hair does not come out in clumps when dry , but when washing it does.  Reports eating well  No other concerns    Interim h/o  who presents for:   Consult referral from Pilo Medina MD for telogen effluvium.  Patient reports this thinning started as a [employer] in high school, during that year was under a lot of stress and weight loss.  Patient reports the hair texture and thinning got worse after May of 2021.  Patient reports she is eating more healthy but does have some stress but still doing better since high school.  Patient reports she did start taking Biotin and she does have low vit D so takes 2000iu daily.  Patient reports history of more seasonal asthma, but has not had an issue since she was 16 or 17 years old.    Site:  Scalp  Duration:  Started 2017  Itching:  Occasional   Bleeding:  None  Pain: none  Modifying factors:  unusure  Change in size:  Unsure   Treatment:  biotin      Patient denies any other skin problems.  Denies easy bruising or bleeding.     Skin Cancer History  History of skin cancer--Negative    REVIEW OF SYSTEMS:  Pertinent positives:  Telegen  Pertinent negatives:  No fever, no chills.    PAST, FAMILY, SOCIAL HISTORY:  Family history of skin cancer--No family history of skin cancer  History of severe sunburns--Negative  Sunscreens--positive  Occupation--[employer]  Lives with--Parents    Family History   Problem Relation Age of Onset   • Hypertension Father           Social History   [facility] Emergency Department    [address] [address]    Phone:  [phone]    Fax:  [phone]                                       Felicitas Avalos   MRN: [id number]    Department:  [facility] Emergency Department   Date of Visit:  9/14/2017           Patient Information     Date Of Birth          1950        Your diagnoses for this visit were:     Urinary frequency     Urgency of urination        You were seen by Avi Noonan PA-C.      Discharge References/Attachments     URINARY TRACT INFECTIONS (UTIS), UNDERSTANDING (ENGLISH)      24 Hour Appointment Hotline       To make an appointment at any [facility] clinic, call [phone] ([phone]). If you don't have a family doctor or clinic, we will help you find one. [facility] clinics are conveniently located to serve the needs of you and your family.             Review of your medicines      START taking        Dose / Directions Last dose taken    nitroFURantoin (macrocrystal-monohydrate) 100 MG capsule   Commonly known as:  MACROBID   Dose:  100 mg   Quantity:  20 capsule        Take 1 capsule (100 mg) by mouth 2 times daily for 10 days   Refills:  0          Our records show that you are taking the medicines listed below. If these are incorrect, please call your family doctor or clinic.        Dose / Directions Last dose taken    ADVIL PM PO        Take  by mouth. As needed for sleep   Refills:  0        ALAVERT PO        Take  by mouth as needed. For spring and fall allergies   Refills:  0        aspirin 81 MG tablet   Quantity:  100        1 tab po QD (Once per day)   Refills:  3        BENADRYL ALLERGY PO        Take  by mouth as needed. For spring and fall allergies   Refills:  0        CALCIUM + D PO        Take  by mouth. Daily unknown dose   Refills:  0        multivitamin per tablet   Dose:  1 tablet   Quantity:  100 Tab        take 1 Tab by mouth daily.   Refills:  12        * order for DME      Tobacco Use   • Smoking status: Never   • Smokeless tobacco: Never   Vaping Use   • Vaping Use: never used   Substance Use Topics   • Alcohol use: Yes     Comment: Socially   • Drug use: Never       Past Medical History:   Diagnosis Date   • Asthma        ALLERGIES:  No Active Allergies    Current Outpatient Medications   Medication Sig Dispense Refill   • albuterol (ACCUNEB) 0.63 MG/3ML nebulizer solution Take 3 mLs by nebulization every 6 hours as needed for Wheezing. 3 mL 3   • montelukast (SINGULAIR) 10 MG tablet Take 1 tablet by mouth nightly. 90 tablet 3   • Vitamin D, Ergocalciferol, 50 mcg (2,000 units) capsule Take 1 capsule by mouth daily. 90 capsule 3   • fluticasone (FLOVENT DISKUS) 100 MCG/BLIST inhaler Inhale into the lungs as needed.       No current facility-administered medications for this visit.       Visit Vitals  Ht 5' 6\" (1.676 m)   Wt 54.4 kg (120 lb)   LMP 05/23/2023 (Exact Date)   BMI 19.37 kg/m²           OBJECTIVE:   General Appearance:  Patient is AO (alert and oriented) x3, WDWN (well developed, well nourished) in NAD (no acute distress).  Patient is well groomed.  Mood/Affect:   Stable/Normal.  Total skin exam -- Thorough evaluation of head (including face and scalp), eyelids, lips, neck,  chest, back, abdomen, upper extremities, lower extremities, buttocks notable for:  Scalp:  Diffuse hair thinning on scalp, negative hair pull test, No scaling or redness.         ASSESSMENT AND PLAN:  Telogen Effluvium (TE)   Discussed with patient TE is multifactorial and involves stressors, illnesses, medications, stress from job, family and finances.     Reassured patient TE is temporary and does not lead to baldness, reassured hair volume will come back as hair bulb persists.  Advised as the cycle for hair growth takes a lot of energy can take 6 months to 1 year for hair volume to come back.     Recommend the following treatment plan:  -Recommend to find an outlet to decrease stress, this will    Quantity:  2 Units        Dynaflex insert   Refills:  0        * order for DME   Quantity:  1 Device        Equipment being ordered: Dynaflex inserts   Refills:  0        simvastatin 40 MG tablet   Commonly known as:  ZOCOR   Dose:  40 mg   Quantity:  90 tablet        Take 1 tablet (40 mg) by mouth At Bedtime at bedtime.   Refills:  3        SUDAFED PO        Take  by mouth as needed. For spring and fall allergies.   Refills:  0        triamcinolone 0.1 % ointment   Commonly known as:  KENALOG   Quantity:  30 g        Apply sparingly to affected area three times daily for 14 days.   Refills:  0        * Notice:  This list has 2 medication(s) that are the same as other medications prescribed for you. Read the directions carefully, and ask your doctor or other care provider to review them with you.            Prescriptions were sent or printed at these locations (1 Prescription)                   [facility] [place] [address]   [address] [address]    Telephone:  [phone]   Fax:  [phone]   Hours:                  E-Prescribed (1 of 1)         nitroFURantoin, macrocrystal-monohydrate, (MACROBID) 100 MG capsule                Procedures and tests performed during your visit     UA reflex to Microscopic    Urine Culture      Orders Needing Specimen Collection     None      Pending Results     Date and Time Order Name Status Description    9/14/2017 1926 Urine Culture In process             Pending Culture Results     Date and Time Order Name Status Description    9/14/2017 1926 Urine Culture In process             Pending Results Instructions     If you had any lab results that were not finalized at the time of your Discharge, you can call the ED Lab Result RN at [phone]. You will be contacted by this team for any positive Lab results or changes in treatment. The nurses are available 7 days a week from 10A to 6:30P.  You can leave a message 24 hours per day  and they will return your call.        Test Results From Your Hospital Stay        9/14/2017  7:36 PM         9/14/2017  7:54 PM      Component Results     Component Value Ref Range & Units Status    Color Urine Yellow  Final    Appearance Urine Slightly Cloudy  Final    Glucose Urine Negative NEG^Negative mg/dL Final    Bilirubin Urine Negative NEG^Negative Final    Ketones Urine Negative NEG^Negative mg/dL Final    Specific Gravity Urine 1.024 1.003 - 1.035 Final    Blood Urine Moderate (A) NEG^Negative Final    pH Urine 6.0 5.0 - 7.0 pH Final    Protein Albumin Urine 30 (A) NEG^Negative mg/dL Final    Urobilinogen mg/dL Normal 0.0 - 2.0 mg/dL Final    Nitrite Urine Negative NEG^Negative Final    Leukocyte Esterase Urine Large (A) NEG^Negative Final    Source Midstream Urine  Final    RBC Urine 29 (H) 0 - 2 /HPF Final    WBC Urine 39 (H) 0 - 2 /HPF Final    Bacteria Urine Few (A) NEG^Negative /HPF Final    Squamous Epithelial /HPF Urine 3 (H) 0 - 1 /HPF Final    Mucous Urine Present (A) NEG^Negative /LPF Final    Hyaline Casts 25 (H) 0 - 2 /LPF Final                Thank you for choosing [facility]       Thank you for choosing [facility] for your care. Our goal is always to provide you with excellent care. Hearing back from our patients is one way we can continue to improve our services. Please take a few minutes to complete the written survey that you may receive in the mail after you visit with us. Thank you!        [facility] Information     [facility] gives you secure access to your electronic health record. If you see a primary care provider, you can also send messages to your care team and make appointments. If you have questions, please call your primary care clinic.  If you do not have a primary care provider, please call [phone] and they will assist you.        Care EveryWhere ID     This is your Care EveryWhere ID. This could be used by other organizations to access your [facility]  help to decrease the hair thinning.  -Start Nutrafol 4 pills a day with the adaptogen  -Recommend to increase your diet, with eggs and avocado.    -Recommend to complete labs today   -Zinc   -Iron panel              - Vitamin D              - thyroid labs and antibodies      Follow up 6 months Call sooner if any problems or concerns.     Ann Whiting MD, PhD  11/20/2023, 1:35 PM          records  [id number]        Equal Access to Services     GUSTAVO MATSON : Sophie Zepeda, harsh gregory, susie mayes. So [facility] [phone].    ATENCIÓN: Si habla español, tiene a bullock disposición servicios gratuitos de asistencia lingüística. Llame al [phone].    We comply with applicable federal civil rights laws and [place] laws. We do not discriminate on the basis of race, color, national origin, age, disability sex, sexual orientation or gender identity.            After Visit Summary       This is your record. Keep this with you and show to your community pharmacist(s) and doctor(s) at your next visit.

## 2024-02-28 ENCOUNTER — PATIENT OUTREACH (OUTPATIENT)
Dept: CARE COORDINATION | Facility: CLINIC | Age: 74
End: 2024-02-28
Payer: COMMERCIAL

## 2024-03-13 ENCOUNTER — PATIENT OUTREACH (OUTPATIENT)
Dept: CARE COORDINATION | Facility: CLINIC | Age: 74
End: 2024-03-13
Payer: COMMERCIAL

## 2025-05-04 ENCOUNTER — HOSPITAL ENCOUNTER (EMERGENCY)
Facility: CLINIC | Age: 75
Discharge: HOME | End: 2025-05-04
Attending: EMERGENCY MEDICINE | Admitting: EMERGENCY MEDICINE
Payer: COMMERCIAL

## 2025-05-04 VITALS
RESPIRATION RATE: 20 BRPM | HEART RATE: 54 BPM | WEIGHT: 153 LBS | OXYGEN SATURATION: 97 % | TEMPERATURE: 97.8 F | BODY MASS INDEX: 26.12 KG/M2 | DIASTOLIC BLOOD PRESSURE: 58 MMHG | SYSTOLIC BLOOD PRESSURE: 116 MMHG | HEIGHT: 64 IN

## 2025-05-04 DIAGNOSIS — M54.12 CERVICAL RADICULOPATHY: ICD-10-CM

## 2025-05-04 LAB
ATRIAL RATE - MUSE: 53 BPM
DIASTOLIC BLOOD PRESSURE - MUSE: NORMAL MMHG
INTERPRETATION ECG - MUSE: NORMAL
P AXIS - MUSE: 58 DEGREES
PR INTERVAL - MUSE: 190 MS
QRS DURATION - MUSE: 92 MS
QT - MUSE: 426 MS
QTC - MUSE: 399 MS
R AXIS - MUSE: 23 DEGREES
SYSTOLIC BLOOD PRESSURE - MUSE: NORMAL MMHG
T AXIS - MUSE: 48 DEGREES
VENTRICULAR RATE- MUSE: 53 BPM

## 2025-05-04 PROCEDURE — 250N000013 HC RX MED GY IP 250 OP 250 PS 637: Performed by: EMERGENCY MEDICINE

## 2025-05-04 PROCEDURE — 93005 ELECTROCARDIOGRAM TRACING: CPT | Performed by: EMERGENCY MEDICINE

## 2025-05-04 PROCEDURE — 99284 EMERGENCY DEPT VISIT MOD MDM: CPT

## 2025-05-04 RX ORDER — OXYCODONE HYDROCHLORIDE 5 MG/1
5 TABLET ORAL EVERY 4 HOURS PRN
Qty: 12 TABLET | Refills: 0 | Status: SHIPPED | OUTPATIENT
Start: 2025-05-04 | End: 2025-05-08

## 2025-05-04 RX ORDER — METHYLPREDNISOLONE 4 MG/1
TABLET ORAL
Qty: 21 TABLET | Refills: 0 | Status: SHIPPED | OUTPATIENT
Start: 2025-05-04

## 2025-05-04 RX ORDER — OXYCODONE HYDROCHLORIDE 5 MG/1
5 TABLET ORAL ONCE
Status: COMPLETED | OUTPATIENT
Start: 2025-05-04 | End: 2025-05-04

## 2025-05-04 RX ADMIN — OXYCODONE 5 MG: 5 TABLET ORAL at 03:02

## 2025-05-04 ASSESSMENT — COLUMBIA-SUICIDE SEVERITY RATING SCALE - C-SSRS
6. HAVE YOU EVER DONE ANYTHING, STARTED TO DO ANYTHING, OR PREPARED TO DO ANYTHING TO END YOUR LIFE?: NO
1. IN THE PAST MONTH, HAVE YOU WISHED YOU WERE DEAD OR WISHED YOU COULD GO TO SLEEP AND NOT WAKE UP?: NO
2. HAVE YOU ACTUALLY HAD ANY THOUGHTS OF KILLING YOURSELF IN THE PAST MONTH?: NO

## 2025-05-04 ASSESSMENT — ACTIVITIES OF DAILY LIVING (ADL): ADLS_ACUITY_SCORE: 41

## 2025-05-04 NOTE — ED PROVIDER NOTES
EMERGENCY DEPARTMENT ENCOUNTER      NAME: Felicitas Avalos  AGE: 74 year old female  YOB: 1950  MRN: [id number]  EVALUATION DATE & TIME: No admission date for patient encounter.    PCP: Ana Bello    ED PROVIDER: Sebas Swift M.D.      Chief Complaint   Patient presents with    Neck Pain         FINAL IMPRESSION:  1. Cervical radiculopathy          ED COURSE & MEDICAL DECISION MAKING:    Pertinent Labs & Imaging studies reviewed. (See chart for details)  74 year old female presents to the Emergency Department for evaluation of neck pain.  No recent injury.  Pain has been present for the last 3 weeks.  Does somewhat radiate down her left arm.  Seems likely cervical radiculopathy.  No pain with axial loading.  Normal neurologic exam.  Seems likely possible cervical radiculopathy versus muscular.  Given no focal deficits I do not think imaging is necessary at this time.  Will give her Medrol Dosepak.  Also given her oxycodone here.  Continue Tylenol ibuprofen at home.  Follow-up with her primary.  Return for worsening symptoms symptoms are not consistent with cardiac disease.  At the request I did do an EKG.  Does not show any acute ischemic changes.  There is poor R wave progression.  No previous to compare.  I do not think further workup for cardiac disease is necessary at this time.  She will return for any worsening symptoms  2:34 AM I met with the patient to gather history and to perform my initial exam. I discussed the plan for care while in the Emergency Department.     At the conclusion of the encounter I discussed the results of all of the tests and the disposition. The questions were answered. The patient or family acknowledged understanding and was agreeable with the care plan.     Medical Decision Making    Discharge. I prescribed additional prescription strength medication(s) as charted. See documentation for any additional details.    MIPS (CTPE, Dental pain, Craft, Sinusitis,  Asthma/COPD, Head Trauma): Not Applicable    SEPSIS: None         MEDICATIONS GIVEN IN THE EMERGENCY:  Medications   oxyCODONE (ROXICODONE) tablet 5 mg (5 mg Oral $Given 5/4/25 0302)       NEW PRESCRIPTIONS STARTED AT TODAY'S ER VISIT  Discharge Medication List as of 5/4/2025  3:15 AM        START taking these medications    Details   methylPREDNISolone (MEDROL DOSEPAK) 4 MG tablet therapy pack Follow Package Directions, Disp-21 tablet, R-0, Local Print      oxyCODONE (ROXICODONE) 5 MG tablet Take 1 tablet (5 mg) by mouth every 4 hours as needed for moderate to severe pain. If pain is not improved with acetaminophen and ibuprofen., Disp-12 tablet, R-0, Local Print                =================================================================    HPI    Patient information was obtained from: Patient    Use of : N/A         Felicitas Avalos is a 74 year old female with a pertinent history of hyperlipidemia, CKD, who presents to this ED for evaluation of neck pain.    Patient reports three weeks ago, she developed neck pain to bilateral sides. She started doing exercises and massages but pain did not go away. Yesterday, she developed pain in her shoulders. Tonight, she woke up with pain radiating down left arm. Patient concerned for a heart attack. She has taken Tylenol and Aleve last night around 2200 and Advil at 0100. Otherwise, she denies any weakness, urinary symptoms, numbness, chest pain, shortness of breath. No recent injuries. Patient reports she takes Simvastatin and baby aspirin regularly. No other complaints at this time.    PAST MEDICAL HISTORY:  Past Medical History:   Diagnosis Date    Arthritis ~10 years ago    First found in lower back, then feet, then right shoulder       PAST SURGICAL HISTORY:  Past Surgical History:   Procedure Laterality Date    COLONOSCOPY  2003     Normal           CURRENT MEDICATIONS:    No current facility-administered medications for this encounter.     Current  Outpatient Medications   Medication Sig Dispense Refill    acetaminophen (TYLENOL) 500 MG tablet Take 1,000 mg by mouth nightly as needed for mild pain      alendronate (FOSAMAX) 70 MG tablet Take 1 tablet (70 mg) by mouth every 7 days 12 tablet 0    ASPIRIN 81 MG OR TABS 1 tab po QD (Once per day) 100 3    calcium-vitamin D 500-125 MG-UNIT TABS Take 1 tablet by mouth 2 times daily      DiphenhydrAMINE HCl (BENADRYL ALLERGY PO) Take  by mouth as needed. For spring and fall allergies        ibuprofen (ADVIL/MOTRIN) 200 MG tablet Take 600 mg by mouth every 4 hours as needed for mild pain      Ibuprofen-Diphenhydramine Cit (ADVIL PM PO) Take  by mouth. As needed for sleep      methylPREDNISolone (MEDROL DOSEPAK) 4 MG tablet therapy pack Follow Package Directions 21 tablet 0    multivitamin (THERAGRAN) per tablet take 1 Tab by mouth daily. 100 Tab 12    naproxen sodium (ANAPROX) 220 MG tablet Take 440 mg by mouth nightly as needed for moderate pain      oxyCODONE (ROXICODONE) 5 MG tablet Take 1 tablet (5 mg) by mouth every 4 hours as needed for moderate to severe pain. If pain is not improved with acetaminophen and ibuprofen. 12 tablet 0    Pseudoephedrine HCl (SUDAFED PO) Take  by mouth as needed. For spring and fall allergies.      simvastatin (ZOCOR) 40 MG tablet TAKE ONE TABLET BY MOUTH EVERY NIGHT AT BEDTIME 90 tablet 0         ALLERGIES:  Allergies   Allergen Reactions    Nka [No Known Allergies]        FAMILY HISTORY:  Family History   Problem Relation Age of Onset    C.A.D. Father         MI    Asthma Father     Hyperlipidemia Father     Heart Disease Father     C.A.RUSSELL. Maternal Grandmother         MI    Alcohol/Drug Maternal Grandfather     Alcohol/Drug Brother     C.A.D. Brother     Hypertension Brother     Lipids Brother     C.A.D. Brother     Anesthesia Reaction Brother     Hyperlipidemia Brother     Coronary Artery Disease Brother     C.A.D. Brother         Stent placed at age 51.    Lipids Brother     Heart  Disease Brother         heart attack    Asthma Brother     Chemical Addiction Brother     Hyperlipidemia Brother     Cancer Brother 72        pancreatic    Other Cancer Brother         Pancreatic    Neurologic Disorder Mother         mild dementia    Heart Disease Mother         afib    Depression/Anxiety Daughter     Mental Illness Daughter         Eating disorder    Asthma Sister     Coronary Artery Disease Sister     Diabetes No family hx of     Breast Cancer No family hx of     Cancer - colorectal No family hx of     Ovarian Cancer No family hx of     Prostate Cancer No family hx of     Cerebrovascular Disease No family hx of     Thyroid Disease No family hx of     Osteoporosis No family hx of     Known Genetic Syndrome No family hx of        SOCIAL HISTORY:   Social History     Socioeconomic History    Marital status:    Tobacco Use    Smoking status: Never    Smokeless tobacco: Never   Substance and Sexual Activity    Alcohol use: Yes     Comment: On rare occasions    Drug use: No    Sexual activity: Yes     Partners: Male     Birth control/protection: Post-menopausal, None     Comment:    Other Topics Concern    Parent/sibling w/ CABG, MI or angioplasty before 65F 55M? Yes     Comment: Brother     Social Drivers of Health     Financial Resource Strain: Low Risk  (4/2/2025)    Received from [employer]    Financial Resource Strain     Difficulty of Paying Living Expenses: 3   Food Insecurity: No Food Insecurity (4/2/2025)    Received from [employer]    Food Insecurity     Do you worry your food will run out before you are able to buy more?: 1   Transportation Needs: No Transportation Needs (4/2/2025)    Received from [employer]    Transportation Needs     Does lack of transportation keep you from medical appointments?: 1     Does lack of transportation keep you from work, meetings or getting  "things that you need?: 1   Social Connections: Socially Integrated (4/2/2025)    Received from [email or web address] [facility]    Social Connections     Do you often feel lonely or isolated from those around you?: 0   Housing Stability: Low Risk  (4/2/2025)    Received from [facility]    Housing Stability     What is your housing situation today?: 1       VITALS:  /58   Pulse 54   Temp 97.8  F (36.6  C) (Oral)   Resp 20   Ht 1.626 m (5' 4\")   Wt 69.4 kg (153 lb)   SpO2 97%   BMI 26.26 kg/m      PHYSICAL EXAM    Physical Exam  Vitals and nursing note reviewed.   Constitutional:       General: She is not in acute distress.     Appearance: She is not diaphoretic.   HENT:      Head: Atraumatic.      Mouth/Throat:      Pharynx: No oropharyngeal exudate.   Eyes:      General: No scleral icterus.     Pupils: Pupils are equal, round, and reactive to light.   Cardiovascular:      Rate and Rhythm: Normal rate and regular rhythm.      Heart sounds: Normal heart sounds.   Pulmonary:      Effort: No respiratory distress.      Breath sounds: Normal breath sounds.   Abdominal:      Palpations: Abdomen is soft.      Tenderness: There is no abdominal tenderness. There is no guarding or rebound. Negative signs include Helm's sign.   Musculoskeletal:      Cervical back: Tenderness (No midline tenderness but there is tenderness along the musculature both sides of her neck) present.   Skin:     General: Skin is warm.      Findings: No rash.   Neurological:      General: No focal deficit present.      Mental Status: She is alert.      Comments: 5 out of 5 strength in bilateral upper and lower extremities.  Sensation intact in all 4 extremes.  Cranial nerves intact.  No pronator drift               LAB:  All pertinent labs reviewed and interpreted.  Labs Ordered and Resulted from Time of ED Arrival to Time of ED Departure - No data to display    RADIOLOGY:  Reviewed all " pertinent imaging. Please see official radiology report.  No orders to display       EKG:    Performed at: 301  Impression: Sinus rate of cardia.  No acute ischemic changes.  No previous available  Sinus bradycardia with a rate of 53.  .  QRS 92.  QTc 399    I have independently reviewed and interpreted the EKG(s) documented above.      I, Ana Bennett, am serving as a scribe to document services personally performed by Dr. Sebas Swift, based on my observation and the provider's statements to me. I, Sebas Swift MD attest that Ana Bennett is acting in a scribe capacity, has observed my performance of the services and has documented them in accordance with my direction.    Sebas Swift M.D.  Emergency Medicine  [facility] EMERGENCY ROOM  [address] [address] [phone]  Dept: [phone]       Sebas Swift MD  05/04/25 0548

## 2025-05-04 NOTE — ED TRIAGE NOTES
Pt comes in for 3 week history of neck pain, radiating to her bilateral shoulders and L arm pain. She says it's painful to turn her neck. She also reports that the pain woke her up from sleep tonight.    She took Aleve and Tylenol at 10 pm, and Advil & Aspirin at 1am with minimal relief.      Triage Assessment (Adult)       Row Name 05/04/25 0238          Triage Assessment    Airway WDL WDL        Respiratory WDL    Respiratory WDL WDL        Skin Circulation/Temperature WDL    Skin Circulation/Temperature WDL WDL        Cardiac WDL    Cardiac WDL WDL        Peripheral/Neurovascular WDL    Peripheral Neurovascular WDL WDL        Cognitive/Neuro/Behavioral WDL    Cognitive/Neuro/Behavioral WDL WDL

## 2025-05-04 NOTE — ED NOTES
Introduced self to patient. Whiteboard updated. Plan of care and length of time discussed with patient. Pain assessed. Spouse at bedside